# Patient Record
Sex: MALE | Race: OTHER | HISPANIC OR LATINO | Employment: OTHER | URBAN - METROPOLITAN AREA
[De-identification: names, ages, dates, MRNs, and addresses within clinical notes are randomized per-mention and may not be internally consistent; named-entity substitution may affect disease eponyms.]

---

## 2017-09-14 ENCOUNTER — HOSPITAL ENCOUNTER (EMERGENCY)
Facility: HOSPITAL | Age: 62
Discharge: HOME | End: 2017-09-14
Attending: EMERGENCY MEDICINE | Admitting: EMERGENCY MEDICINE
Payer: COMMERCIAL

## 2017-09-14 VITALS
OXYGEN SATURATION: 98 % | SYSTOLIC BLOOD PRESSURE: 166 MMHG | WEIGHT: 140 LBS | TEMPERATURE: 98.5 F | HEART RATE: 60 BPM | DIASTOLIC BLOOD PRESSURE: 98 MMHG | RESPIRATION RATE: 16 BRPM

## 2017-09-14 DIAGNOSIS — H26.9 CATARACT: Primary | ICD-10-CM

## 2017-09-14 PROCEDURE — 99283 EMERGENCY DEPT VISIT LOW MDM: CPT

## 2017-09-14 RX ORDER — PROPARACAINE HYDROCHLORIDE 5 MG/ML
2 SOLUTION/ DROPS OPHTHALMIC ONCE
Status: COMPLETED | OUTPATIENT
Start: 2017-09-14 | End: 2017-09-14

## 2017-09-14 RX ADMIN — FLUORESCEIN SODIUM 1 STRIP: 1 STRIP OPHTHALMIC at 17:22

## 2017-09-14 RX ADMIN — PROPARACAINE HYDROCHLORIDE 2 DROP: 5 SOLUTION/ DROPS OPHTHALMIC at 17:22

## 2019-06-18 ENCOUNTER — DOCTOR'S OFFICE (OUTPATIENT)
Dept: URBAN - METROPOLITAN AREA CLINIC 136 | Facility: CLINIC | Age: 64
Setting detail: OPHTHALMOLOGY
End: 2019-06-18
Payer: COMMERCIAL

## 2019-06-18 DIAGNOSIS — H25.013: ICD-10-CM

## 2019-06-18 DIAGNOSIS — H25.012: ICD-10-CM

## 2019-06-18 PROCEDURE — 76512 OPH US DX B-SCAN: CPT | Performed by: OPHTHALMOLOGY

## 2019-06-18 PROCEDURE — 92004 COMPRE OPH EXAM NEW PT 1/>: CPT | Performed by: OPHTHALMOLOGY

## 2019-06-18 ASSESSMENT — VISUAL ACUITY
OD_BCVA: 20/HM
OS_BCVA: 20/300

## 2019-06-18 ASSESSMENT — REFRACTION_AUTOREFRACTION
OD_SPHERE: -0.25
OD_CYLINDER: -1.50
OD_AXIS: 096

## 2019-06-18 ASSESSMENT — REFRACTION_MANIFEST
OS_VA2: 20/
OD_VA3: 20/
OD_VA1: 20/
OS_VA2: 20/
OD_VA1: 20/
OS_VA3: 20/
OD_VA3: 20/
OU_VA: 20/
OS_VA3: 20/
OS_VA1: 20/
OD_VA2: 20/
OS_VA1: 20/
OU_VA: 20/
OD_VA2: 20/

## 2019-06-18 ASSESSMENT — REFRACTION_CURRENTRX
OD_OVR_VA: 20/
OD_OVR_VA: 20/
OS_OVR_VA: 20/
OD_OVR_VA: 20/
OS_OVR_VA: 20/
OS_OVR_VA: 20/

## 2019-06-18 ASSESSMENT — CONFRONTATIONAL VISUAL FIELD TEST (CVF)
OD_FINDINGS: FULL
OS_FINDINGS: CONSTRICTION

## 2019-06-18 ASSESSMENT — SPHEQUIV_DERIVED: OD_SPHEQUIV: -1

## 2021-06-02 ENCOUNTER — HOSPITAL ENCOUNTER (INPATIENT)
Facility: HOSPITAL | Age: 66
LOS: 8 days | Discharge: HOME HEALTH | DRG: 840 | End: 2021-06-10
Attending: EMERGENCY MEDICINE | Admitting: HOSPITALIST
Payer: MEDICARE

## 2021-06-02 ENCOUNTER — APPOINTMENT (INPATIENT)
Dept: RADIOLOGY | Facility: HOSPITAL | Age: 66
DRG: 840 | End: 2021-06-02
Payer: MEDICARE

## 2021-06-02 ENCOUNTER — APPOINTMENT (EMERGENCY)
Dept: RADIOLOGY | Facility: HOSPITAL | Age: 66
DRG: 840 | End: 2021-06-02
Payer: MEDICARE

## 2021-06-02 DIAGNOSIS — Z85.71 HISTORY OF HODGKIN'S LYMPHOMA: ICD-10-CM

## 2021-06-02 DIAGNOSIS — I26.93 SINGLE SUBSEGMENTAL PULMONARY EMBOLISM WITHOUT ACUTE COR PULMONALE (HCC): ICD-10-CM

## 2021-06-02 DIAGNOSIS — R50.9 FEVER: ICD-10-CM

## 2021-06-02 DIAGNOSIS — I95.9 HYPOTENSION: ICD-10-CM

## 2021-06-02 DIAGNOSIS — I42.9 CARDIOMYOPATHY (HCC): ICD-10-CM

## 2021-06-02 DIAGNOSIS — A41.9 SEPSIS WITHOUT ACUTE ORGAN DYSFUNCTION, DUE TO UNSPECIFIED ORGANISM (HCC): ICD-10-CM

## 2021-06-02 DIAGNOSIS — E87.6 HYPOKALEMIA: ICD-10-CM

## 2021-06-02 DIAGNOSIS — I47.2 NONSUSTAINED VENTRICULAR TACHYCARDIA (HCC): ICD-10-CM

## 2021-06-02 DIAGNOSIS — R94.31 ABNORMAL ECG: ICD-10-CM

## 2021-06-02 DIAGNOSIS — C81.93 HODGKIN LYMPHOMA OF INTRA-ABDOMINAL LYMPH NODES, UNSPECIFIED HODGKIN LYMPHOMA TYPE (HCC): Chronic | ICD-10-CM

## 2021-06-02 DIAGNOSIS — D64.9 ANEMIA: ICD-10-CM

## 2021-06-02 DIAGNOSIS — E87.2 LACTIC ACIDOSIS: ICD-10-CM

## 2021-06-02 DIAGNOSIS — R65.10: ICD-10-CM

## 2021-06-02 DIAGNOSIS — R00.0 SINUS TACHYCARDIA: ICD-10-CM

## 2021-06-02 DIAGNOSIS — N39.0 ACUTE URINARY TRACT INFECTION: Primary | ICD-10-CM

## 2021-06-02 PROBLEM — C81.90 HODGKIN LYMPHOMA (HCC): Status: ACTIVE | Noted: 2021-06-02

## 2021-06-02 PROBLEM — E87.1 HYPONATREMIA: Status: ACTIVE | Noted: 2021-06-02

## 2021-06-02 LAB
ABO GROUP BLD: NORMAL
ABO GROUP BLD: NORMAL
ALBUMIN SERPL BCP-MCNC: 1.6 G/DL (ref 3.5–5)
ALP SERPL-CCNC: 206 U/L (ref 46–116)
ALT SERPL W P-5'-P-CCNC: 49 U/L (ref 12–78)
ANION GAP SERPL CALCULATED.3IONS-SCNC: 5 MMOL/L (ref 4–13)
APTT PPP: 36 SECONDS (ref 23–37)
AST SERPL W P-5'-P-CCNC: 40 U/L (ref 5–45)
ATRIAL RATE: 126 BPM
BACTERIA UR QL AUTO: ABNORMAL /HPF
BASOPHILS # BLD AUTO: 0 THOUSANDS/ΜL (ref 0–0.1)
BASOPHILS NFR BLD AUTO: 0 % (ref 0–1)
BILIRUB SERPL-MCNC: 0.32 MG/DL (ref 0.2–1)
BILIRUB UR QL STRIP: NEGATIVE
BLD GP AB SCN SERPL QL: NEGATIVE
BUN SERPL-MCNC: 12 MG/DL (ref 5–25)
CALCIUM ALBUM COR SERPL-MCNC: 10.4 MG/DL (ref 8.3–10.1)
CALCIUM SERPL-MCNC: 8.5 MG/DL (ref 8.3–10.1)
CAOX CRY URNS QL MICRO: ABNORMAL /HPF
CHLORIDE SERPL-SCNC: 95 MMOL/L (ref 100–108)
CLARITY UR: CLEAR
CO2 SERPL-SCNC: 35 MMOL/L (ref 21–32)
COLOR UR: ABNORMAL
CREAT SERPL-MCNC: 0.57 MG/DL (ref 0.6–1.3)
EOSINOPHIL # BLD AUTO: 0.2 THOUSAND/ΜL (ref 0–0.61)
EOSINOPHIL NFR BLD AUTO: 4 % (ref 0–6)
ERYTHROCYTE [DISTWIDTH] IN BLOOD BY AUTOMATED COUNT: 20.2 % (ref 11.6–15.1)
GFR SERPL CREATININE-BSD FRML MDRD: 107 ML/MIN/1.73SQ M
GLUCOSE SERPL-MCNC: 99 MG/DL (ref 65–140)
GLUCOSE UR STRIP-MCNC: ABNORMAL MG/DL
HCT VFR BLD AUTO: 21.4 % (ref 36.5–49.3)
HGB BLD-MCNC: 6.3 G/DL (ref 12–17)
HGB UR QL STRIP.AUTO: NEGATIVE
HYALINE CASTS #/AREA URNS LPF: ABNORMAL /LPF
IMM GRANULOCYTES # BLD AUTO: 0.06 THOUSAND/UL (ref 0–0.2)
IMM GRANULOCYTES NFR BLD AUTO: 1 % (ref 0–2)
INR PPP: 1.62 (ref 0.84–1.19)
KETONES UR STRIP-MCNC: NEGATIVE MG/DL
LACTATE SERPL-SCNC: 1.6 MMOL/L (ref 0.5–2)
LACTATE SERPL-SCNC: 3.4 MMOL/L (ref 0.5–2)
LEUKOCYTE ESTERASE UR QL STRIP: NEGATIVE
LYMPHOCYTES # BLD AUTO: 0.28 THOUSANDS/ΜL (ref 0.6–4.47)
LYMPHOCYTES NFR BLD AUTO: 6 % (ref 14–44)
MCH RBC QN AUTO: 21 PG (ref 26.8–34.3)
MCHC RBC AUTO-ENTMCNC: 29.4 G/DL (ref 31.4–37.4)
MCV RBC AUTO: 71 FL (ref 82–98)
MONOCYTES # BLD AUTO: 0.34 THOUSAND/ΜL (ref 0.17–1.22)
MONOCYTES NFR BLD AUTO: 7 % (ref 4–12)
MUCOUS THREADS UR QL AUTO: ABNORMAL
NEUTROPHILS # BLD AUTO: 4.24 THOUSANDS/ΜL (ref 1.85–7.62)
NEUTS SEG NFR BLD AUTO: 82 % (ref 43–75)
NITRITE UR QL STRIP: NEGATIVE
NON-SQ EPI CELLS URNS QL MICRO: ABNORMAL /HPF
NRBC BLD AUTO-RTO: 0 /100 WBCS
P AXIS: 80 DEGREES
PH UR STRIP.AUTO: 7.5 [PH]
PLATELET # BLD AUTO: 417 THOUSANDS/UL (ref 149–390)
PMV BLD AUTO: 9.3 FL (ref 8.9–12.7)
POTASSIUM SERPL-SCNC: 4.3 MMOL/L (ref 3.5–5.3)
PR INTERVAL: 152 MS
PROCALCITONIN SERPL-MCNC: 0.63 NG/ML
PROT SERPL-MCNC: 6.3 G/DL (ref 6.4–8.2)
PROT UR STRIP-MCNC: ABNORMAL MG/DL
PROTHROMBIN TIME: 18.9 SECONDS (ref 11.6–14.5)
QRS AXIS: 66 DEGREES
QRSD INTERVAL: 78 MS
QT INTERVAL: 328 MS
QTC INTERVAL: 449 MS
RBC # BLD AUTO: 3 MILLION/UL (ref 3.88–5.62)
RBC #/AREA URNS AUTO: ABNORMAL /HPF
RH BLD: POSITIVE
RH BLD: POSITIVE
SARS-COV-2 RNA RESP QL NAA+PROBE: NEGATIVE
SODIUM SERPL-SCNC: 135 MMOL/L (ref 136–145)
SP GR UR STRIP.AUTO: 1.02 (ref 1–1.03)
SPECIMEN EXPIRATION DATE: NORMAL
T WAVE AXIS: 73 DEGREES
UROBILINOGEN UR QL STRIP.AUTO: 1 E.U./DL
VENTRICULAR RATE: 113 BPM
WBC # BLD AUTO: 5.12 THOUSAND/UL (ref 4.31–10.16)
WBC #/AREA URNS AUTO: ABNORMAL /HPF

## 2021-06-02 PROCEDURE — 86920 COMPATIBILITY TEST SPIN: CPT

## 2021-06-02 PROCEDURE — 84145 PROCALCITONIN (PCT): CPT | Performed by: EMERGENCY MEDICINE

## 2021-06-02 PROCEDURE — 86900 BLOOD TYPING SEROLOGIC ABO: CPT | Performed by: EMERGENCY MEDICINE

## 2021-06-02 PROCEDURE — 85025 COMPLETE CBC W/AUTO DIFF WBC: CPT | Performed by: EMERGENCY MEDICINE

## 2021-06-02 PROCEDURE — 96361 HYDRATE IV INFUSION ADD-ON: CPT

## 2021-06-02 PROCEDURE — 86901 BLOOD TYPING SEROLOGIC RH(D): CPT | Performed by: EMERGENCY MEDICINE

## 2021-06-02 PROCEDURE — 85730 THROMBOPLASTIN TIME PARTIAL: CPT | Performed by: EMERGENCY MEDICINE

## 2021-06-02 PROCEDURE — U0005 INFEC AGEN DETEC AMPLI PROBE: HCPCS | Performed by: EMERGENCY MEDICINE

## 2021-06-02 PROCEDURE — 85610 PROTHROMBIN TIME: CPT | Performed by: EMERGENCY MEDICINE

## 2021-06-02 PROCEDURE — 99223 1ST HOSP IP/OBS HIGH 75: CPT | Performed by: INTERNAL MEDICINE

## 2021-06-02 PROCEDURE — 30233N1 TRANSFUSION OF NONAUTOLOGOUS RED BLOOD CELLS INTO PERIPHERAL VEIN, PERCUTANEOUS APPROACH: ICD-10-PCS | Performed by: EMERGENCY MEDICINE

## 2021-06-02 PROCEDURE — 36430 TRANSFUSION BLD/BLD COMPNT: CPT

## 2021-06-02 PROCEDURE — 83605 ASSAY OF LACTIC ACID: CPT | Performed by: EMERGENCY MEDICINE

## 2021-06-02 PROCEDURE — 80053 COMPREHEN METABOLIC PANEL: CPT | Performed by: EMERGENCY MEDICINE

## 2021-06-02 PROCEDURE — 87040 BLOOD CULTURE FOR BACTERIA: CPT | Performed by: EMERGENCY MEDICINE

## 2021-06-02 PROCEDURE — 86850 RBC ANTIBODY SCREEN: CPT | Performed by: EMERGENCY MEDICINE

## 2021-06-02 PROCEDURE — 1124F ACP DISCUSS-NO DSCNMKR DOCD: CPT | Performed by: EMERGENCY MEDICINE

## 2021-06-02 PROCEDURE — 71045 X-RAY EXAM CHEST 1 VIEW: CPT

## 2021-06-02 PROCEDURE — 93010 ELECTROCARDIOGRAM REPORT: CPT | Performed by: INTERNAL MEDICINE

## 2021-06-02 PROCEDURE — 96360 HYDRATION IV INFUSION INIT: CPT

## 2021-06-02 PROCEDURE — 36415 COLL VENOUS BLD VENIPUNCTURE: CPT

## 2021-06-02 PROCEDURE — 96365 THER/PROPH/DIAG IV INF INIT: CPT

## 2021-06-02 PROCEDURE — 81001 URINALYSIS AUTO W/SCOPE: CPT | Performed by: EMERGENCY MEDICINE

## 2021-06-02 PROCEDURE — 99285 EMERGENCY DEPT VISIT HI MDM: CPT

## 2021-06-02 PROCEDURE — U0003 INFECTIOUS AGENT DETECTION BY NUCLEIC ACID (DNA OR RNA); SEVERE ACUTE RESPIRATORY SYNDROME CORONAVIRUS 2 (SARS-COV-2) (CORONAVIRUS DISEASE [COVID-19]), AMPLIFIED PROBE TECHNIQUE, MAKING USE OF HIGH THROUGHPUT TECHNOLOGIES AS DESCRIBED BY CMS-2020-01-R: HCPCS | Performed by: EMERGENCY MEDICINE

## 2021-06-02 PROCEDURE — P9016 RBC LEUKOCYTES REDUCED: HCPCS

## 2021-06-02 PROCEDURE — 99291 CRITICAL CARE FIRST HOUR: CPT | Performed by: EMERGENCY MEDICINE

## 2021-06-02 PROCEDURE — 93005 ELECTROCARDIOGRAM TRACING: CPT

## 2021-06-02 RX ORDER — LORATADINE 10 MG/1
10 TABLET ORAL DAILY
COMMUNITY

## 2021-06-02 RX ORDER — IBUPROFEN 200 MG
400 TABLET ORAL EVERY 6 HOURS PRN
COMMUNITY
End: 2021-06-10 | Stop reason: HOSPADM

## 2021-06-02 RX ORDER — ACETAMINOPHEN 325 MG/1
650 TABLET ORAL EVERY 6 HOURS PRN
Status: DISCONTINUED | OUTPATIENT
Start: 2021-06-02 | End: 2021-06-10 | Stop reason: HOSPADM

## 2021-06-02 RX ORDER — ACETAMINOPHEN 325 MG/1
975 TABLET ORAL ONCE
Status: COMPLETED | OUTPATIENT
Start: 2021-06-02 | End: 2021-06-02

## 2021-06-02 RX ORDER — PANTOPRAZOLE SODIUM 40 MG/1
40 TABLET, DELAYED RELEASE ORAL DAILY
COMMUNITY

## 2021-06-02 RX ORDER — DILTIAZEM HYDROCHLORIDE 5 MG/ML
10 INJECTION INTRAVENOUS ONCE
Status: COMPLETED | OUTPATIENT
Start: 2021-06-02 | End: 2021-06-02

## 2021-06-02 RX ORDER — FERROUS SULFATE 325(65) MG
325 TABLET ORAL
COMMUNITY

## 2021-06-02 RX ORDER — HEPARIN SODIUM 5000 [USP'U]/ML
5000 INJECTION, SOLUTION INTRAVENOUS; SUBCUTANEOUS EVERY 8 HOURS SCHEDULED
Status: DISCONTINUED | OUTPATIENT
Start: 2021-06-02 | End: 2021-06-04

## 2021-06-02 RX ADMIN — CEFEPIME HYDROCHLORIDE 2000 MG: 2 INJECTION, POWDER, FOR SOLUTION INTRAVENOUS at 16:28

## 2021-06-02 RX ADMIN — ACETAMINOPHEN 650 MG: 325 TABLET, FILM COATED ORAL at 22:17

## 2021-06-02 RX ADMIN — HEPARIN SODIUM 5000 UNITS: 5000 INJECTION INTRAVENOUS; SUBCUTANEOUS at 22:18

## 2021-06-02 RX ADMIN — SODIUM CHLORIDE 1000 ML: 0.9 INJECTION, SOLUTION INTRAVENOUS at 15:32

## 2021-06-02 RX ADMIN — ACETAMINOPHEN 975 MG: 325 TABLET, FILM COATED ORAL at 16:24

## 2021-06-02 RX ADMIN — DILTIAZEM HYDROCHLORIDE 10 MG: 5 INJECTION INTRAVENOUS at 22:11

## 2021-06-02 NOTE — ED PROVIDER NOTES
History  Chief Complaint   Patient presents with    Fever - 9 weeks to 74 years     pt with hx of cancer continues with fevers and chills  was recently admitted at [address] [facility] for the same  fevers continue  states recently tested for covid which was negative  pt denies any complaints or pain at this time  77 y o  M w/ h/o Hodgkin lymphoma p/w fever x few days  Pt just admitted to [facility] on 5/25-5/30/21 (pt receives all care at [address]) for fever/hypoNa/hypoK  Working dx that fever was related to his cancer  Pt reports he was okay after discharge, but then fever returned  Associated with frequency and "a little" cough  Pt denies HA, CP, SOB, abd pain, myalgias  History provided by:  Relative and patient   used: Yes ([employer])    Fever - 9 weeks to 74 years  Max temp prior to arrival:  Unsure  Chronicity:  Recurrent  Relieved by:  None tried  Worsened by:  Nothing  Ineffective treatments:  None tried  Associated symptoms: cough    Associated symptoms: no chest pain, no nausea and no vomiting    Risk factors: hx of cancer and recent sickness        Prior to Admission Medications   Prescriptions Last Dose Informant Patient Reported? Taking?   ferrous sulfate 325 (65 Fe) mg tablet   Yes Yes   Sig: Take 325 mg by mouth daily with breakfast   ibuprofen (MOTRIN) 200 mg tablet   Yes Yes   Sig: Take 400 mg by mouth every 6 (six) hours as needed for mild pain   loratadine (CLARITIN) 10 mg tablet   Yes Yes   Sig: Take 10 mg by mouth daily   pantoprazole (PROTONIX) 40 mg tablet   Yes Yes   Sig: Take 40 mg by mouth daily      Facility-Administered Medications: None       Past Medical History:   Diagnosis Date    Lymphoma ([facility] [place] [address] )        History reviewed  No pertinent surgical history  History reviewed  No pertinent family history  I have reviewed and agree with the history as documented      E-Cigarette/Vaping     E-Cigarette/Vaping Substances     Social History     Tobacco Use    Smoking status: Never Smoker    Smokeless tobacco: Never Used   Substance Use Topics    Alcohol use: No    Drug use: No       Review of Systems   Constitutional: Positive for fever  Respiratory: Positive for cough  Negative for shortness of breath  Cardiovascular: Negative for chest pain  Gastrointestinal: Negative for abdominal pain, nausea and vomiting  Genitourinary: Positive for frequency  All other systems reviewed and are negative  Physical Exam  Physical Exam  Vitals signs and nursing note reviewed  Constitutional:       General: He is not in acute distress  Appearance: He is well-developed  He is not ill-appearing, toxic-appearing or diaphoretic  HENT:      Head: Normocephalic and atraumatic  Eyes:      General: No scleral icterus  Conjunctiva/sclera:      Right eye: Right conjunctiva is not injected  Left eye: Left conjunctiva is not injected  Neck:      Musculoskeletal: Normal range of motion  Vascular: No JVD  Trachea: Trachea normal    Cardiovascular:      Rate and Rhythm: Regular rhythm  Tachycardia present  Pulses: Normal pulses  Heart sounds: Normal heart sounds  No murmur  No friction rub  Pulmonary:      Effort: Pulmonary effort is normal  No accessory muscle usage or respiratory distress  Breath sounds: No stridor  Rhonchi present  No wheezing or rales  Chest:      Chest wall: No tenderness  Abdominal:      General: There is no distension  Palpations: Abdomen is soft  Tenderness: There is no abdominal tenderness  There is no guarding or rebound  Skin:     General: Skin is warm and dry  Coloration: Skin is not pale  Findings: No rash  Neurological:      Mental Status: He is alert  GCS: GCS eye subscore is 4  GCS verbal subscore is 5  GCS motor subscore is 6     Psychiatric:         Behavior: Behavior normal          Vital Signs  ED Triage Vitals   Temperature Pulse Respirations Blood Pressure SpO2   06/02/21 1525 06/02/21 1525 06/02/21 1525 06/02/21 1525 06/02/21 1525   (!) 103 1 °F (39 5 °C) (!) 126 18 92/51 96 %      Temp Source Heart Rate Source Patient Position - Orthostatic VS BP Location FiO2 (%)   06/02/21 1525 06/02/21 1751 06/02/21 1751 06/02/21 1751 --   Oral Monitor Lying Left arm       Pain Score       06/02/21 1624       Med Not Given for Pain - for MAR use only           Vitals:    06/02/21 1921 06/02/21 1935 06/02/21 1945 06/1955   BP: 96/62 101/65 103/69 106/69   Pulse: 93 92 94 89   Patient Position - Orthostatic VS: Lying            Visual Acuity      ED Medications  Medications   heparin (porcine) subcutaneous injection 5,000 Units (has no administration in time range)   acetaminophen (TYLENOL) tablet 650 mg (has no administration in time range)   cefepime (MAXIPIME) 2,000 mg in dextrose 5 % 50 mL IVPB (has no administration in time range)   sodium chloride 0 9 % bolus 1,000 mL (0 mL Intravenous Stopped 6/2/21 1722)   cefepime (MAXIPIME) 2 g/50 mL dextrose IVPB (0 mg Intravenous Stopped 6/2/21 1751)   acetaminophen (TYLENOL) tablet 975 mg (975 mg Oral Given 6/2/21 1624)       Diagnostic Studies  Results Reviewed     Procedure Component Value Units Date/Time    Lactic acid 2 Hours [[id number]]  (Normal) Collected: 06/02/21 1724    Lab Status: Final result Specimen: Blood from Arm, Right Updated: 06/02/21 1800     LACTIC ACID 1 6 mmol/L     Narrative:      Result may be elevated if tourniquet was used during collection  Novel Coronavirus (Covid-19),PCR [facility] - 2 Hour Stat [[id number]]  (Normal) Collected: 06/02/21 1626    Lab Status: Final result Specimen: Nares from Nasopharyngeal Swab Updated: 06/02/21 1730     SARS-CoV-2 Negative    Narrative:       The specimen collection materials, transport medium, and/or testing methodology utilized in the production of these test results have been proven to be reliable in a limited validation with an abbreviated program under the Emergency Utilization Authorization provided by the FDA  Testing reported as "Presumptive positive" will be confirmed with secondary testing to ensure result accuracy  Clinical caution and judgement should be used with the interpretation of these results with consideration of the clinical impression and other laboratory testing  Testing reported as "Positive" or "Negative" has been proven to be accurate according to standard laboratory validation requirements  All testing is performed with control materials showing appropriate reactivity at standard intervals  Urine Microscopic [[id number]]  (Abnormal) Collected: 06/02/21 1529    Lab Status: Final result Specimen: Urine, Clean Catch Updated: 06/02/21 1611     RBC, UA None Seen /hpf      WBC, UA 2-4 /hpf      Epithelial Cells Occasional /hpf      Bacteria, UA Moderate /hpf      Hyaline Casts, UA 1-2 /lpf      Ca Oxalate Kayla, UA Moderate /hpf      MUCUS THREADS Innumerable    Lactic acid [[id number]]  (Abnormal) Collected: 06/02/21 1527    Lab Status: Final result Specimen: Blood from Arm, Right Updated: 06/02/21 1603     LACTIC ACID 3 4 mmol/L     Narrative:      Result may be elevated if tourniquet was used during collection      Comprehensive metabolic panel [[id number]]  (Abnormal) Collected: 06/02/21 1527    Lab Status: Final result Specimen: Blood from Arm, Right Updated: 06/02/21 1559     Sodium 135 mmol/L      Potassium 4 3 mmol/L      Chloride 95 mmol/L      CO2 35 mmol/L      ANION GAP 5 mmol/L      BUN 12 mg/dL      Creatinine 0 57 mg/dL      Glucose 99 mg/dL      Calcium 8 5 mg/dL      Corrected Calcium 10 4 mg/dL      AST 40 U/L      ALT 49 U/L      Alkaline Phosphatase 206 U/L      Total Protein 6 3 g/dL      Albumin 1 6 g/dL      Total Bilirubin 0 32 mg/dL      eGFR 107 ml/min/1 73sq m     Narrative:      [place] guidelines for Chronic Kidney Disease (CKD):     Stage 1 with normal or high GFR (GFR > 90 mL/min/1 73 square meters)    Stage 2 Mild CKD (GFR = 60-89 mL/min/1 73 square meters)    Stage 3A Moderate CKD (GFR = 45-59 mL/min/1 73 square meters)    Stage 3B Moderate CKD (GFR = 30-44 mL/min/1 73 square meters)    Stage 4 Severe CKD (GFR = 15-29 mL/min/1 73 square meters)    Stage 5 End Stage CKD (GFR <15 mL/min/1 73 square meters)  Note: GFR calculation is accurate only with a steady state creatinine    UA w Reflex to Microscopic w Reflex to Culture [[id number]]  (Abnormal) Collected: 06/02/21 1529    Lab Status: Final result Specimen: Urine, Clean Catch Updated: 06/02/21 1556     Color, UA Cheryl     Clarity, UA Clear     Specific [place], UA 1 020     pH, UA 7 5     Leukocytes, UA Negative     Nitrite, UA Negative     Protein,  (2+) mg/dl      Glucose,  (1/4%) mg/dl      Ketones, UA Negative mg/dl      Urobilinogen, UA 1 0 E U /dl      Bilirubin, UA Negative     Blood, UA Negative    APTT [[id number]]  (Normal) Collected: 06/02/21 1527    Lab Status: Final result Specimen: Blood from Arm, Right Updated: 06/02/21 1554     PTT 36 seconds     Protime-INR [[id number]]  (Abnormal) Collected: 06/02/21 1527    Lab Status: Final result Specimen: Blood from Arm, Right Updated: 06/02/21 1554     Protime 18 9 seconds      INR 1 62    CBC and differential [[id number]]  (Abnormal) Collected: 06/02/21 1527    Lab Status: Final result Specimen: Blood from Arm, Right Updated: 06/02/21 1546     WBC 5 12 Thousand/uL      RBC 3 00 Million/uL      Hemoglobin 6 3 g/dL      Hematocrit 21 4 %      MCV 71 fL      MCH 21 0 pg      MCHC 29 4 g/dL      RDW 20 2 %      MPV 9 3 fL      Platelets 336 Thousands/uL      nRBC 0 /100 WBCs      Neutrophils Relative 82 %      Immat GRANS % 1 %      Lymphocytes Relative 6 %      Monocytes Relative 7 %      Eosinophils Relative 4 %      Basophils Relative 0 %      Neutrophils Absolute 4 24 Thousands/µL      Immature Grans Absolute 0 06 Thousand/uL      Lymphocytes Absolute 0 28 Thousands/µL      Monocytes Absolute 0 34 Thousand/µL      Eosinophils Absolute 0 20 Thousand/µL      Basophils Absolute 0 00 Thousands/µL     Procalcitonin with AM Reflex [[id number]] Collected: 06/02/21 1527    Lab Status: In process Specimen: Blood from Arm, Right Updated: 06/02/21 1535    Blood culture #2 [[id number]] Collected: 06/02/21 1527    Lab Status: In process Specimen: Blood from Arm, Left Updated: 06/02/21 1534    Blood culture #1 [[id number]] Collected: 06/02/21 1527    Lab Status:  In process Specimen: Blood from Arm, Right Updated: 06/02/21 1534                 XR chest 1 view portable   Final Result by Alpheus Dance, MD (06/02 1919)      No evidence of acute abnormality in the chest                   Workstation performed: [id number]                    Procedures  ECG 12 Lead Documentation Only    Date/Time: 6/2/2021 4:00 PM  Performed by: Hector Calderon DO  Authorized by: Hector Calderon DO     Indications / Diagnosis:  Fever  ECG reviewed by me, the ED Provider: yes    Patient location:  Bedside  Previous ECG:     Previous ECG:  Unavailable  Rate:     ECG rate:  113    ECG rate assessment: tachycardic    Rhythm:     Rhythm: sinus tachycardia    Ectopy:     Ectopy: none    QRS:     QRS axis:  Normal    QRS intervals:  Normal  ST segments:     ST segments:  Normal  T waves:     T waves: normal      CriticalCare Time  Performed by: Hector Calderon DO  Authorized by: Hector Calderon DO     Critical care provider statement:     Critical care time (minutes):  60    Critical care time was exclusive of:  Separately billable procedures and treating other patients and teaching time    Critical care was necessary to treat or prevent imminent or life-threatening deterioration of the following conditions:  Sepsis and circulatory failure    Critical care was time spent personally by me on the following activities:  Blood draw for specimens, obtaining history from patient or surrogate, development of treatment plan with patient or surrogate, discussions with consultants, evaluation of patient's response to treatment, examination of patient, ordering and performing treatments and interventions, ordering and review of laboratory studies, ordering and review of radiographic studies, re-evaluation of patient's condition and review of old charts    I assumed direction of critical care for this patient from another provider in my specialty: no               ED Course  ED Course as of Jun 02 2102   Wed Jun 02, 2021   1556 7 2 on 5/28/21  Pt consented to blood  Hemoglobin(!!): 6 3   1558 Temperature(!): 103 1 °F (39 5 °C)   1558 Pulse(!): 126   1602 Blood Pressure: 92/51   1603 INR(!): 1 62   1604 Cefepime ordered  Sepsis alert called  LACTIC ACID(!!): 3 4   1612 Bacteria, UA(!): Moderate   1833 Improved   LACTIC ACID: 1 6                         Initial Sepsis Screening     Row Name 06/02/21 1602                Is the patient's history suggestive of a new or worsening infection?  --        Suspected source of infection  pneumonia;urinary tract infection  -CR        Are two or more of the following signs & symptoms of infection both present and new to the patient?  --        Indicate SIRS criteria  Hyperthemia > 38 3C (100 9F); Tachycardia > 90 bpm  -CR        If the answer is yes to both questions, suspicion of sepsis is present  --        If severe sepsis is present AND tissue hypoperfusion perists in the hour after fluid resuscitation or lactate > 4, the patient meets criteria for SEPTIC SHOCK  --        Are any of the following organ dysfunction criteria present within 6 hours of suspected infection and SIRS criteria that are NOT considered to be chronic conditions? (!) Yes  -CR        Organ dysfunction  INR > 1 5 or aPTT > 60 secs; Lactate > 2 0 mmol/L  -CR        Date of presentation of severe sepsis  06/02/21  -CR        Time of presentation of severe sepsis  1603  -CR        Tissue hypoperfusion persists in the hour after crystalloid fluid administration, evidenced, by either:  --        Was hypotension present within one hour of the conclusion of crystalloid fluid administration? No  -CR        Date of presentation of septic shock  --        Time of presentation of septic shock  --          User Key  (r) = Recorded By, (t) = Taken By, (c) = Cosigned By    [address] Name Provider Type    TABATHA Maria DO Physician          SBIRT 22yo+      Most Recent Value   SBIRT (23 yo +)   In order to provide better care to our patients, we are screening all of our patients for alcohol and drug use  Would it be okay to ask you these screening questions? Yes Filed at: 06/02/2021 1536   Initial Alcohol Screen: US AUDIT-C    1  How often do you have a drink containing alcohol?  0 Filed at: 06/02/2021 1536   2  How many drinks containing alcohol do you have on a typical day you are drinking? 0 Filed at: 06/02/2021 1536   3b  FEMALE Any Age, or MALE 65+: How often do you have 4 or more drinks on one occassion? 0 Filed at: 06/02/2021 1536   Audit-C Score  0 Filed at: 06/02/2021 1536   ASHISH: How many times in the past year have you    Used an illegal drug or used a prescription medication for non-medical reasons?   Never Filed at: 06/02/2021 1536                    MDM    Disposition  Final diagnoses:   Acute urinary tract infection   Fever   Anemia   History of Hodgkin's lymphoma   Lactic acidosis     Time reflects when diagnosis was documented in both MDM as applicable and the Disposition within this note     Time User Action Codes Description Comment    6/2/2021  4:20 PM Belkis Maria Add [N39 0] Acute urinary tract infection     6/2/2021  4:20 PM Belkis Maria Add [R50 9] Fever     6/2/2021  4:20 PM Jadine Boom [D64 9] Anemia     6/2/2021  4:21 PM Gudelia Cretre Add [Z85 71] History of Hodgkin's lymphoma     6/2/2021  4:21 PM Radha Maria 48 [E87 2] Lactic acidosis       ED Disposition     ED Disposition Condition Date/Time Comment Admit Stable Wed Jun 2, 2021  6:11 PM Case was discussed with Dr Jeromy Zambrano and the patient's admission status was agreed to be Admission Status: inpatient status to the service of Dr Jeromy Zambrano  Follow-up Information    None         Current Discharge Medication List      CONTINUE these medications which have NOT CHANGED    Details   ferrous sulfate 325 (65 Fe) mg tablet Take 325 mg by mouth daily with breakfast      ibuprofen (MOTRIN) 200 mg tablet Take 400 mg by mouth every 6 (six) hours as needed for mild pain      loratadine (CLARITIN) 10 mg tablet Take 10 mg by mouth daily      pantoprazole (PROTONIX) 40 mg tablet Take 40 mg by mouth daily           No discharge procedures on file      PDMP Review     None          ED Provider  Electronically Signed by           DO Kiley  06/02/21 2405

## 2021-06-02 NOTE — ASSESSMENT & PLAN NOTE
· Likely secondary to lymphoma    · He has no jaundice/elevated LFTs to suggest hemolysis  · He has no sign of bleeding  · He will be transfused in the ED  · Continue to monitor and transfuse as needed

## 2021-06-02 NOTE — ASSESSMENT & PLAN NOTE
· Will need to coordinate with his hematologist/oncologist at [facility] regarding continuation of care  · According to his wife and [facility] records he will need immunotherapy , however this has not been started due to insurance issues    · He is high risk for infection due to his lymphoma and immunocompromised state

## 2021-06-02 NOTE — H&P
[facility]  H&P- Deangelo Cortes 1955, 77 y o  male MRN: [id number]  Unit/Bed#: ED 26 Encounter: [id number]  Primary Care Provider: Jennifer Rodriguez MD   Date and time admitted to hospital: 6/2/2021  3:12 PM    * Sepsis [facility])  Assessment & Plan  · Sepsis due to possible UTI/pneumonia versus SIRS from lymphoma  · Noted to have elevated lactic acid together with fever and tachycardia on admission  · Follow-up blood and urine culture results  · Start empiric cefepime    Hyponatremia  Assessment & Plan  · Mild likely due to underlying malignancy  · Monitor    Anemia  Assessment & Plan  · Likely secondary to lymphoma  · He has no jaundice/elevated LFTs to suggest hemolysis  · He has no sign of bleeding  · He will be transfused in the ED  · Continue to monitor and transfuse as needed    Hodgkin lymphoma [facility])  Assessment & Plan  · Will need to coordinate with his hematologist/oncologist at [facility] regarding continuation of care  · According to his wife and [facility] records he will need immunotherapy , however this has not been started due to insurance issues  · He is high risk for infection due to his lymphoma and immunocompromised state      VTE Prophylaxis: Heparin    Code Status:  Full code  POLST: There is no POLST form on file for this patient (pre-hospital)  Discussion with family:  Wife    Anticipated Length of Stay:  Patient will be admitted on an Inpatient basis with an anticipated length of stay of  at least 2 midnights  Justification for Hospital Stay:  Sepsis    Total Time for Visit, including Counseling / Coordination of Care: 45 minutes  Greater than 50% of this total time spent on direct patient counseling and coordination of care  Chief Complaint:   Fever chills    History of Present Illness:    Deangelo Cortes is a 77 y o  male who presents with recurrent fever and chills  Patient was interviewed via  [id number]   Information was provided by the patient, wife, and review of care everywhere record  In brief he is a 27-year-old male with known history of Hodgkin's lymphoma as far back as August 2018  He has been followed by his hematologist and has had several courses of treatment in 2018, 2019 and 2020  He apparently left in August 2020 to live in the [facility] and came back to [facility] in February 2021  In April, he was found to have progression of the disease with new biopsy-proven metastatic disease to the liver  He was admitted at [facility] last week for fever and chills  He was placed on empiric antibiotics and was seen by ID  Infection was ruled out and his fever and chills were felt to be due to tumor fever  He was sent home in stable condition  He was doing well until today when he had recurrence of the fever and chills  His wife was called from work and he was brought to the ER  On admission he was noted to be febrile and tachycardic meeting sepsis criteria  Patient denied cough, shortness of breath, phlegm, diarrhea, dysuria      Review of Systems:    Review of Systems   Constitutional: Positive for chills and fever  HENT: Negative  Eyes: Negative  Respiratory: Negative  Cardiovascular: Negative  Gastrointestinal: Negative  Genitourinary: Negative  Musculoskeletal: Negative  Neurological: Negative  Psychiatric/Behavioral: Negative  All other systems reviewed and are negative  Past Medical and Surgical History:     Past Medical History:   Diagnosis Date    Lymphoma [facility])        History reviewed  No pertinent surgical history  Meds/Allergies:    Prior to Admission medications    Medication Sig Start Date End Date Taking?  Authorizing Provider   ferrous sulfate 325 (65 Fe) mg tablet Take 325 mg by mouth daily with breakfast   Yes Historical Provider, MD   ibuprofen (MOTRIN) 200 mg tablet Take 400 mg by mouth every 6 (six) hours as needed for mild pain   Yes Historical Provider, MD   loratadine (CLARITIN) 10 mg tablet Take 10 mg by mouth daily   Yes Historical Provider, MD   pantoprazole (PROTONIX) 40 mg tablet Take 40 mg by mouth daily   Yes Historical Provider, MD     Reviewed via epic    Allergies: No Known Allergies    Social History:     Marital Status: /Civil Union   Occupation:  None  Previously worked as a   Patient Pre-hospital Living Situation:  Lives with wife  Patient Pre-hospital Level of Mobility:  Independent  Patient Pre-hospital Diet Restrictions:  None  Substance Use History:   Social History     Substance and Sexual Activity   Alcohol Use No     Social History     Tobacco Use   Smoking Status Never Smoker   Smokeless Tobacco Never Used     Social History     Substance and Sexual Activity   Drug Use No       Family History:    Father  at a very old age 80  Mother  from a fall    Physical Exam:     Vitals:   Blood Pressure: 103/69 (21)  Pulse: 94 (21)  Temperature: 97 6 °F (36 4 °C) (21)  Temp Source: Oral (21)  Respirations: 16 (21)  SpO2: 98 % (21)    Physical Exam  Vitals signs reviewed  Constitutional:       Appearance: He is ill-appearing  Comments: Cachectic   HENT:      Head: Normocephalic and atraumatic  Eyes:      General: No scleral icterus  Neck:      Musculoskeletal: Neck supple  Cardiovascular:      Rate and Rhythm: Regular rhythm  Tachycardia present  Pulmonary:      Effort: No respiratory distress  Breath sounds: No wheezing  Comments: Coarse breath sounds bilaterally  Prominent ribs and clavicles  Abdominal:      General: Abdomen is flat  Palpations: Abdomen is soft  Musculoskeletal:      Right lower leg: No edema  Left lower leg: No edema  Skin:     General: Skin is warm and dry  Neurological:      Mental Status: Mental status is at baseline     Psychiatric:         Mood and Affect: Mood normal          Behavior: Behavior normal      Additional Data:     Lab Results: I have personally reviewed pertinent reports  Results from last 7 days   Lab Units 06/02/21  1527   WBC Thousand/uL 5 12   HEMOGLOBIN g/dL 6 3*   HEMATOCRIT % 21 4*   PLATELETS Thousands/uL 417*   NEUTROS PCT % 82*   LYMPHS PCT % 6*   MONOS PCT % 7   EOS PCT % 4     Results from last 7 days   Lab Units 06/02/21  1527   SODIUM mmol/L 135*   POTASSIUM mmol/L 4 3   CHLORIDE mmol/L 95*   CO2 mmol/L 35*   BUN mg/dL 12   CREATININE mg/dL 0 57*   ANION GAP mmol/L 5   CALCIUM mg/dL 8 5   ALBUMIN g/dL 1 6*   TOTAL BILIRUBIN mg/dL 0 32   ALK PHOS U/L 206*   ALT U/L 49   AST U/L 40   GLUCOSE RANDOM mg/dL 99     Results from last 7 days   Lab Units 06/02/21  1527   INR  1 62*             Results from last 7 days   Lab Units 06/02/21  1724 06/02/21  1527   LACTIC ACID mmol/L 1 6 3 4*       Imaging: I have personally reviewed pertinent reports  XR chest 1 view portable   Final Result by Mica Orozco MD (06/02 1919)      No evidence of acute abnormality in the chest                   Workstation performed: [id number]             EKG, Pathology, and Other Studies Reviewed on Admission:   · EKG:  Sinus tachycardia    Allscripts / Epic Records Reviewed: Yes     ** Please Note: This note has been constructed using a voice recognition system   **

## 2021-06-02 NOTE — ASSESSMENT & PLAN NOTE
· Sepsis due to possible pneumonia versus SIRS from lymphoma  · Noted to have elevated lactic acid together with fever and tachycardia on admission  · Follow-up blood and urine culture results  · Start empiric cefepime

## 2021-06-02 NOTE — SEPSIS NOTE
Sepsis Note   Chaitanya Abdul 77 y o  male MRN: [id number]  Unit/Bed#: ED 26 Encounter: [id number]      qSOFA     [address] Name 06/02/21 1525                Altered mental status GCS < 15  --        Respiratory Rate > / =88  0        Systolic BP < / =747  1        Q Sofa Score  1            Initial Sepsis Screening     Row Name 06/02/21 1602                Is the patient's history suggestive of a new or worsening infection?  --        Suspected source of infection  pneumonia;urinary tract infection  -CR        Are two or more of the following signs & symptoms of infection both present and new to the patient?  --        Indicate SIRS criteria  Hyperthemia > 38 3C (100 9F); Tachycardia > 90 bpm  -CR        If the answer is yes to both questions, suspicion of sepsis is present  --        If severe sepsis is present AND tissue hypoperfusion perists in the hour after fluid resuscitation or lactate > 4, the patient meets criteria for SEPTIC SHOCK  --        Are any of the following organ dysfunction criteria present within 6 hours of suspected infection and SIRS criteria that are NOT considered to be chronic conditions? (!) Yes  -CR        Organ dysfunction  INR > 1 5 or aPTT > 60 secs; Lactate > 2 0 mmol/L  -CR        Date of presentation of severe sepsis  06/02/21  -CR        Time of presentation of severe sepsis  1603  -CR        Tissue hypoperfusion persists in the hour after crystalloid fluid administration, evidenced, by either:  --        Was hypotension present within one hour of the conclusion of crystalloid fluid administration?   No  -CR        Date of presentation of septic shock  --        Time of presentation of septic shock  --          User Key  (r) = Recorded By, (t) = Taken By, (c) = Cosigned By    [address] Name Provider Type    CR [place], DO Physician

## 2021-06-03 LAB
ABO GROUP BLD BPU: NORMAL
ALBUMIN SERPL BCP-MCNC: 1.5 G/DL (ref 3.5–5)
ALP SERPL-CCNC: 186 U/L (ref 46–116)
ALT SERPL W P-5'-P-CCNC: 41 U/L (ref 12–78)
ANION GAP SERPL CALCULATED.3IONS-SCNC: 9 MMOL/L (ref 4–13)
AST SERPL W P-5'-P-CCNC: 32 U/L (ref 5–45)
BASOPHILS # BLD AUTO: 0.01 THOUSANDS/ΜL (ref 0–0.1)
BASOPHILS NFR BLD AUTO: 0 % (ref 0–1)
BILIRUB SERPL-MCNC: 0.55 MG/DL (ref 0.2–1)
BPU ID: NORMAL
BUN SERPL-MCNC: 10 MG/DL (ref 5–25)
CALCIUM ALBUM COR SERPL-MCNC: 10.5 MG/DL (ref 8.3–10.1)
CALCIUM SERPL-MCNC: 8.5 MG/DL (ref 8.3–10.1)
CHLORIDE SERPL-SCNC: 97 MMOL/L (ref 100–108)
CO2 SERPL-SCNC: 30 MMOL/L (ref 21–32)
CREAT SERPL-MCNC: 0.54 MG/DL (ref 0.6–1.3)
CROSSMATCH: NORMAL
EOSINOPHIL # BLD AUTO: 0.12 THOUSAND/ΜL (ref 0–0.61)
EOSINOPHIL NFR BLD AUTO: 2 % (ref 0–6)
ERYTHROCYTE [DISTWIDTH] IN BLOOD BY AUTOMATED COUNT: 21.4 % (ref 11.6–15.1)
GFR SERPL CREATININE-BSD FRML MDRD: 109 ML/MIN/1.73SQ M
GLUCOSE SERPL-MCNC: 97 MG/DL (ref 65–140)
HCT VFR BLD AUTO: 23.8 % (ref 36.5–49.3)
HGB BLD-MCNC: 7.2 G/DL (ref 12–17)
IMM GRANULOCYTES # BLD AUTO: 0.08 THOUSAND/UL (ref 0–0.2)
IMM GRANULOCYTES NFR BLD AUTO: 1 % (ref 0–2)
LYMPHOCYTES # BLD AUTO: 0.34 THOUSANDS/ΜL (ref 0.6–4.47)
LYMPHOCYTES NFR BLD AUTO: 5 % (ref 14–44)
MCH RBC QN AUTO: 21.6 PG (ref 26.8–34.3)
MCHC RBC AUTO-ENTMCNC: 30.3 G/DL (ref 31.4–37.4)
MCV RBC AUTO: 71 FL (ref 82–98)
MONOCYTES # BLD AUTO: 0.42 THOUSAND/ΜL (ref 0.17–1.22)
MONOCYTES NFR BLD AUTO: 7 % (ref 4–12)
NEUTROPHILS # BLD AUTO: 5.45 THOUSANDS/ΜL (ref 1.85–7.62)
NEUTS SEG NFR BLD AUTO: 85 % (ref 43–75)
NRBC BLD AUTO-RTO: 0 /100 WBCS
PLATELET # BLD AUTO: 414 THOUSANDS/UL (ref 149–390)
PMV BLD AUTO: 9 FL (ref 8.9–12.7)
POTASSIUM SERPL-SCNC: 4.1 MMOL/L (ref 3.5–5.3)
PROCALCITONIN SERPL-MCNC: 8.38 NG/ML
PROT SERPL-MCNC: 6.1 G/DL (ref 6.4–8.2)
RBC # BLD AUTO: 3.34 MILLION/UL (ref 3.88–5.62)
SODIUM SERPL-SCNC: 136 MMOL/L (ref 136–145)
UNIT DISPENSE STATUS: NORMAL
UNIT PRODUCT CODE: NORMAL
UNIT RH: NORMAL
WBC # BLD AUTO: 6.42 THOUSAND/UL (ref 4.31–10.16)

## 2021-06-03 PROCEDURE — 84145 PROCALCITONIN (PCT): CPT | Performed by: EMERGENCY MEDICINE

## 2021-06-03 PROCEDURE — 99232 SBSQ HOSP IP/OBS MODERATE 35: CPT | Performed by: PHYSICIAN ASSISTANT

## 2021-06-03 PROCEDURE — 80053 COMPREHEN METABOLIC PANEL: CPT | Performed by: INTERNAL MEDICINE

## 2021-06-03 PROCEDURE — 94640 AIRWAY INHALATION TREATMENT: CPT

## 2021-06-03 PROCEDURE — 85025 COMPLETE CBC W/AUTO DIFF WBC: CPT | Performed by: INTERNAL MEDICINE

## 2021-06-03 RX ORDER — LEVALBUTEROL 1.25 MG/.5ML
SOLUTION, CONCENTRATE RESPIRATORY (INHALATION)
Status: COMPLETED
Start: 2021-06-03 | End: 2021-06-03

## 2021-06-03 RX ORDER — SODIUM CHLORIDE FOR INHALATION 0.9 %
VIAL, NEBULIZER (ML) INHALATION
Status: COMPLETED
Start: 2021-06-03 | End: 2021-06-03

## 2021-06-03 RX ORDER — MIDODRINE HYDROCHLORIDE 5 MG/1
5 TABLET ORAL
Status: DISCONTINUED | OUTPATIENT
Start: 2021-06-03 | End: 2021-06-04

## 2021-06-03 RX ADMIN — MIDODRINE HYDROCHLORIDE 5 MG: 5 TABLET ORAL at 16:28

## 2021-06-03 RX ADMIN — ISODIUM CHLORIDE 3 ML: 0.03 SOLUTION RESPIRATORY (INHALATION) at 04:42

## 2021-06-03 RX ADMIN — HEPARIN SODIUM 5000 UNITS: 5000 INJECTION INTRAVENOUS; SUBCUTANEOUS at 13:36

## 2021-06-03 RX ADMIN — SODIUM CHLORIDE 1000 ML: 0.9 INJECTION, SOLUTION INTRAVENOUS at 03:24

## 2021-06-03 RX ADMIN — LEVALBUTEROL HYDROCHLORIDE 1.25 MG: 1.25 SOLUTION, CONCENTRATE RESPIRATORY (INHALATION) at 04:42

## 2021-06-03 RX ADMIN — ACETAMINOPHEN 650 MG: 325 TABLET, FILM COATED ORAL at 12:33

## 2021-06-03 RX ADMIN — CEFEPIME HYDROCHLORIDE 2000 MG: 2 INJECTION, POWDER, FOR SOLUTION INTRAVENOUS at 16:28

## 2021-06-03 RX ADMIN — HEPARIN SODIUM 5000 UNITS: 5000 INJECTION INTRAVENOUS; SUBCUTANEOUS at 06:27

## 2021-06-03 RX ADMIN — CEFEPIME HYDROCHLORIDE 2000 MG: 2 INJECTION, POWDER, FOR SOLUTION INTRAVENOUS at 06:26

## 2021-06-03 RX ADMIN — MIDODRINE HYDROCHLORIDE 5 MG: 5 TABLET ORAL at 12:33

## 2021-06-03 RX ADMIN — HEPARIN SODIUM 5000 UNITS: 5000 INJECTION INTRAVENOUS; SUBCUTANEOUS at 22:03

## 2021-06-03 NOTE — ASSESSMENT & PLAN NOTE
· Sepsis due to possible pneumonia versus SIRS from lymphoma  · Noted to have elevated lactic acid together with fever and tachycardia on admission  · Urine culture without acute infection  · Chest x-ray no focal opacity to suggest pneumonia  Mild bilateral hilar fullness suspected left greater than right suspecting underlying hilar lymphadenopathy  · Noted to have elevated procalcitonin of 0 63    With repeat of 8 38  · Blood cultures pending  · Started on empiric cefepime-will continue at this time  · Will have Infectious Disease see in consultation

## 2021-06-03 NOTE — PLAN OF CARE
Problem: Potential for Falls  Goal: Patient will remain free of falls  Description: INTERVENTIONS:  - Assess patient frequently for physical needs  -  Identify cognitive and physical deficits and behaviors that affect risk of falls    -  [place] fall precautions as indicated by assessment   - Educate patient/family on patient safety including physical limitations  - Instruct patient to call for assistance with activity based on assessment  - Modify environment to reduce risk of injury  - Consider OT/PT consult to assist with strengthening/mobility  Outcome: Progressing     Problem: PAIN - ADULT  Goal: Verbalizes/displays adequate comfort level or baseline comfort level  Description: Interventions:  - Encourage patient to monitor pain and request assistance  - Assess pain using appropriate pain scale  - Administer analgesics based on type and severity of pain and evaluate response  - Implement non-pharmacological measures as appropriate and evaluate response  - Consider cultural and social influences on pain and pain management  - Notify physician/advanced practitioner if interventions unsuccessful or patient reports new pain  Outcome: Progressing     Problem: INFECTION - ADULT  Goal: Absence or prevention of progression during hospitalization  Description: INTERVENTIONS:  - Assess and monitor for signs and symptoms of infection  - Monitor lab/diagnostic results  - Monitor all insertion sites, i e  indwelling lines, tubes, and drains  - Monitor endotracheal if appropriate and nasal secretions for changes in amount and color  - [place] appropriate cooling/warming therapies per order  - Administer medications as ordered  - Instruct and encourage patient and family to use good hand hygiene technique  - Identify and instruct in appropriate isolation precautions for identified infection/condition  Outcome: Progressing  Goal: Absence of fever/infection during neutropenic period  Description: INTERVENTIONS:  - Monitor WBC    Outcome: Progressing     Problem: SAFETY ADULT  Goal: Patient will remain free of falls  Description: INTERVENTIONS:  - Assess patient frequently for physical needs  -  Identify cognitive and physical deficits and behaviors that affect risk of falls    -  [place] fall precautions as indicated by assessment   - Educate patient/family on patient safety including physical limitations  - Instruct patient to call for assistance with activity based on assessment  - Modify environment to reduce risk of injury  - Consider OT/PT consult to assist with strengthening/mobility  Outcome: Progressing  Goal: Maintain or return to baseline ADL function  Description: INTERVENTIONS:  -  Assess patient's ability to carry out ADLs; assess patient's baseline for ADL function and identify physical deficits which impact ability to perform ADLs (bathing, care of mouth/teeth, toileting, grooming, dressing, etc )  - Assess/evaluate cause of self-care deficits   - Assess range of motion  - Assess patient's mobility; develop plan if impaired  - Assess patient's need for assistive devices and provide as appropriate  - Encourage maximum independence but intervene and supervise when necessary  - Involve family in performance of ADLs  - Assess for home care needs following discharge   - Consider OT consult to assist with ADL evaluation and planning for discharge  - Provide patient education as appropriate  Outcome: Progressing  Goal: Maintain or return mobility status to optimal level  Description: INTERVENTIONS:  - Assess patient's baseline mobility status (ambulation, transfers, stairs, etc )    - Identify cognitive and physical deficits and behaviors that affect mobility  - Identify mobility aids required to assist with transfers and/or ambulation (gait belt, sit-to-stand, lift, walker, cane, etc )  - [place] fall precautions as indicated by assessment  - Record patient progress and toleration of activity level on Mobility SBAR; progress patient to next Phase/Stage  - Instruct patient to call for assistance with activity based on assessment  - Consider rehabilitation consult to assist with strengthening/weightbearing, etc   Outcome: Progressing     Problem: DISCHARGE PLANNING  Goal: Discharge to home or other facility with appropriate resources  Description: INTERVENTIONS:  - Identify barriers to discharge w/patient and caregiver  - Arrange for needed discharge resources and transportation as appropriate  - Identify discharge learning needs (meds, wound care, etc )  - Arrange for interpretive services to assist at discharge as needed  - Refer to Case Management Department for coordinating discharge planning if the patient needs post-hospital services based on physician/advanced practitioner order or complex needs related to functional status, cognitive ability, or social support system  Outcome: Progressing     Problem: Knowledge Deficit  Goal: Patient/family/caregiver demonstrates understanding of disease process, treatment plan, medications, and discharge instructions  Description: Complete learning assessment and assess knowledge base    Interventions:  - Provide teaching at level of understanding  - Provide teaching via preferred learning methods  Outcome: Progressing

## 2021-06-03 NOTE — ASSESSMENT & PLAN NOTE
· Known to hematologist/oncologist at [facility]  · According to his wife and [facility] records he will need immunotherapy , however this has not been started due to insurance issues    · He is high risk for infection due to his lymphoma and immunocompromised state

## 2021-06-03 NOTE — QUICK NOTE
Notified by nurse that he finished his blood transfusion but is tachycardiac at 165, sounds congested , and with fever  Check stat portable cxr r/o pulmonary edema  Check EKG  Give IV cardizem 10 mg x 1

## 2021-06-03 NOTE — PROGRESS NOTES
[address] [place] [address]  Progress Note - Kaushik Rhodes 1955, 77 y o  male MRN: [id number]  Unit/Bed#: E5 -01 Encounter: [id number]  Primary Care Provider: Rogelio Mabry MD   Date and time admitted to hospital: 6/2/2021  3:12 PM    * Sepsis [facility])  Assessment & Plan  · Sepsis due to possible pneumonia versus SIRS from lymphoma  · Noted to have elevated lactic acid together with fever and tachycardia on admission  · Urine culture without acute infection  · Chest x-ray no focal opacity to suggest pneumonia  Mild bilateral hilar fullness suspected left greater than right suspecting underlying hilar lymphadenopathy  · Noted to have elevated procalcitonin of 0 63  With repeat of 8 38  · Blood cultures pending  · Started on empiric cefepime-will continue at this time  · Will have Infectious Disease see in consultation    Hyponatremia  Assessment & Plan  · Mild likely due to underlying malignancy  · Recheck sodium with improvement    Anemia  Assessment & Plan  · Likely secondary to lymphoma  · He has no jaundice/elevated LFTs to suggest hemolysis  · He has no sign of bleeding  · Hemoglobin on admission 6 3   · Status post 1 unit of PRBCs with rise in hemoglobin to 7 2   · Continue to monitor as patient may need another transfusion  · A m  Lab work    Hodgkin lymphoma [facility])  Assessment & Plan  · Known to hematologist/oncologist at [facility]  · According to his wife and [facility] records he will need immunotherapy , however this has not been started due to insurance issues  · He is high risk for infection due to his lymphoma and immunocompromised state        VTE Pharmacologic Prophylaxis:   Pharmacologic: Heparin  Mechanical VTE Prophylaxis in Place: Yes    Discharge Plan:   With need for continued inpatient stay for sepsis and IV antibiotics    Discussions with Specialists or Other Care Team Provider:  Nursing    Education and Discussions with Family / Patient:  Patient, attempted to call wife with  on the line-obtained busy signal x2    Time Spent for Care: 45 minutes  More than 50% of total time spent on counseling and coordination of care as described above  Current Length of Stay: 1 day(s)  Current Patient Status: Inpatient   Code Status: Level 1 - Full Code    Subjective:   Patient resting comfortably in bed  He is an extremely poor historian  He is unable to provide any meaningful history  Attempted to call wife while at bed side with patient along with presence of  however kept getting the busy signal   Patient himself thinks that he is at [place] to name only  States he is here to get better and keep fighting  Objective:     Vitals:   Temp (24hrs), Av 9 °F (37 2 °C), Min:97 2 °F (36 2 °C), Max:102 8 °F (39 3 °C)    Temp:  [97 2 °F (36 2 °C)-102 8 °F (39 3 °C)] 97 9 °F (36 6 °C)  HR:  [] 103  Resp:  [16-22] 18  BP: ()/(46-91) 93/63  SpO2:  [90 %-99 %] 99 %  There is no height or weight on file to calculate BMI  Input and Output Summary (last 24 hours): Intake/Output Summary (Last 24 hours) at 6/3/2021 1628  Last data filed at 2021 2217  Gross per 24 hour   Intake 1138 33 ml   Output 200 ml   Net 938 33 ml       Physical Exam:     Physical Exam  Vitals signs and nursing note reviewed  Constitutional:       Appearance: Normal appearance  Comments: Frail and cachectic-appearing   HENT:      Head: Normocephalic and atraumatic  Eyes:      General: No scleral icterus  Cardiovascular:      Rate and Rhythm: Normal rate and regular rhythm  Pulmonary:      Effort: Pulmonary effort is normal  No respiratory distress  Breath sounds: Normal breath sounds  No stridor  No wheezing or rhonchi  Abdominal:      General: Bowel sounds are normal  There is no distension  Palpations: Abdomen is soft  There is no mass  Tenderness: There is no abdominal tenderness  Hernia: No hernia is present  Musculoskeletal:         General: No swelling  Skin:     General: Skin is warm and dry  Neurological:      Comments: Oriented to person only   Psychiatric:         Mood and Affect: Mood normal          Additional Data:     Labs:    Results from last 7 days   Lab Units 06/03/21  0633   WBC Thousand/uL 6 42   HEMOGLOBIN g/dL 7 2*   HEMATOCRIT % 23 8*   PLATELETS Thousands/uL 414*   NEUTROS PCT % 85*   LYMPHS PCT % 5*   MONOS PCT % 7   EOS PCT % 2     Results from last 7 days   Lab Units 06/03/21  0633   POTASSIUM mmol/L 4 1   CHLORIDE mmol/L 97*   CO2 mmol/L 30   BUN mg/dL 10   CREATININE mg/dL 0 54*   CALCIUM mg/dL 8 5   ALK PHOS U/L 186*   ALT U/L 41   AST U/L 32     Results from last 7 days   Lab Units 06/02/21  1527   INR  1 62*       * I Have Reviewed All Lab Data Listed Above  * Additional Pertinent Lab Tests Reviewed: Jeb 66 Admission Reviewed    Imaging:    Imaging Reports Reviewed Today Include: CXR  Imaging Personally Reviewed by Myself Includes:      Recent Cultures (last 7 days):     Results from last 7 days   Lab Units 06/02/21  1527   BLOOD CULTURE  Received in Microbiology Lab  Culture in Progress  Received in Microbiology Lab  Culture in Progress  Last 24 Hours Medication List:   Current Facility-Administered Medications   Medication Dose Route Frequency Provider Last Rate    acetaminophen  650 mg Oral Q6H PRN Liliam Durham MD      cefepime  2,000 mg Intravenous Q12H Liliam Durham MD 2,000 mg (06/03/21 0626)    heparin (porcine)  5,000 Units Subcutaneous [facility] Liliam Durham MD      midodrine  5 mg Oral TID AC Laura Socks, DO          Today, Patient Was Seen By: Dolph Aschoff, PA-C    ** Please Note: This note has been constructed using a voice recognition system   **

## 2021-06-03 NOTE — NURSING NOTE
Notified on call slim pt's bp 89/59  Bolus ordered for pt  Pt did not tolerate bolus  Pt had exploratory/inspirarory wheezes, congested, cough, and difficulty breathing  Fluids stopped and  Slim notified  New orders obtained

## 2021-06-03 NOTE — ASSESSMENT & PLAN NOTE
· Likely secondary to lymphoma  · He has no jaundice/elevated LFTs to suggest hemolysis  · He has no sign of bleeding  · Hemoglobin on admission 6 3   · Status post 1 unit of PRBCs with rise in hemoglobin to 7 2   · Continue to monitor as patient may need another transfusion  · A m   Lab work non spec st t abn

## 2021-06-03 NOTE — PLAN OF CARE
Problem: Potential for Falls  Goal: Patient will remain free of falls  Description: INTERVENTIONS:  - Assess patient frequently for physical needs  -  Identify cognitive and physical deficits and behaviors that affect risk of falls    -  [place] fall precautions as indicated by assessment   - Educate patient/family on patient safety including physical limitations  - Instruct patient to call for assistance with activity based on assessment  - Modify environment to reduce risk of injury  - Consider OT/PT consult to assist with strengthening/mobility  Outcome: Progressing     Problem: PAIN - ADULT  Goal: Verbalizes/displays adequate comfort level or baseline comfort level  Description: Interventions:  - Encourage patient to monitor pain and request assistance  - Assess pain using appropriate pain scale  - Administer analgesics based on type and severity of pain and evaluate response  - Implement non-pharmacological measures as appropriate and evaluate response  - Consider cultural and social influences on pain and pain management  - Notify physician/advanced practitioner if interventions unsuccessful or patient reports new pain  Outcome: Progressing     Problem: INFECTION - ADULT  Goal: Absence or prevention of progression during hospitalization  Description: INTERVENTIONS:  - Assess and monitor for signs and symptoms of infection  - Monitor lab/diagnostic results  - Monitor all insertion sites, i e  indwelling lines, tubes, and drains  - Monitor endotracheal if appropriate and nasal secretions for changes in amount and color  - [place] appropriate cooling/warming therapies per order  - Administer medications as ordered  - Instruct and encourage patient and family to use good hand hygiene technique  - Identify and instruct in appropriate isolation precautions for identified infection/condition  Outcome: Progressing  Goal: Absence of fever/infection during neutropenic period  Description: INTERVENTIONS:  - Monitor WBC    Outcome: Progressing     Problem: SAFETY ADULT  Goal: Patient will remain free of falls  Description: INTERVENTIONS:  - Assess patient frequently for physical needs  -  Identify cognitive and physical deficits and behaviors that affect risk of falls    -  [place] fall precautions as indicated by assessment   - Educate patient/family on patient safety including physical limitations  - Instruct patient to call for assistance with activity based on assessment  - Modify environment to reduce risk of injury  - Consider OT/PT consult to assist with strengthening/mobility  Outcome: Progressing  Goal: Maintain or return to baseline ADL function  Description: INTERVENTIONS:  -  Assess patient's ability to carry out ADLs; assess patient's baseline for ADL function and identify physical deficits which impact ability to perform ADLs (bathing, care of mouth/teeth, toileting, grooming, dressing, etc )  - Assess/evaluate cause of self-care deficits   - Assess range of motion  - Assess patient's mobility; develop plan if impaired  - Assess patient's need for assistive devices and provide as appropriate  - Encourage maximum independence but intervene and supervise when necessary  - Involve family in performance of ADLs  - Assess for home care needs following discharge   - Consider OT consult to assist with ADL evaluation and planning for discharge  - Provide patient education as appropriate  Outcome: Progressing  Goal: Maintain or return mobility status to optimal level  Description: INTERVENTIONS:  - Assess patient's baseline mobility status (ambulation, transfers, stairs, etc )    - Identify cognitive and physical deficits and behaviors that affect mobility  - Identify mobility aids required to assist with transfers and/or ambulation (gait belt, sit-to-stand, lift, walker, cane, etc )  - [place] fall precautions as indicated by assessment  - Record patient progress and toleration of activity level on Mobility SBAR; progress patient to next Phase/Stage  - Instruct patient to call for assistance with activity based on assessment  - Consider rehabilitation consult to assist with strengthening/weightbearing, etc   Outcome: Progressing     Problem: DISCHARGE PLANNING  Goal: Discharge to home or other facility with appropriate resources  Description: INTERVENTIONS:  - Identify barriers to discharge w/patient and caregiver  - Arrange for needed discharge resources and transportation as appropriate  - Identify discharge learning needs (meds, wound care, etc )  - Arrange for interpretive services to assist at discharge as needed  - Refer to Case Management Department for coordinating discharge planning if the patient needs post-hospital services based on physician/advanced practitioner order or complex needs related to functional status, cognitive ability, or social support system  Outcome: Progressing     Problem: Knowledge Deficit  Goal: Patient/family/caregiver demonstrates understanding of disease process, treatment plan, medications, and discharge instructions  Description: Complete learning assessment and assess knowledge base    Interventions:  - Provide teaching at level of understanding  - Provide teaching via preferred learning methods  Outcome: Progressing     Problem: Prexisting or High Potential for Compromised Skin Integrity  Goal: Skin integrity is maintained or improved  Description: INTERVENTIONS:  - Identify patients at risk for skin breakdown  - Assess and monitor skin integrity  - Assess and monitor nutrition and hydration status  - Monitor labs   - Assess for incontinence   - Turn and reposition patient  - Assist with mobility/ambulation  - Relieve pressure over bony prominences  - Avoid friction and shearing  - Provide appropriate hygiene as needed including keeping skin clean and dry  - Evaluate need for skin moisturizer/barrier cream  - Collaborate with interdisciplinary team   - Patient/family teaching  - Consider wound care consult   Outcome: Progressing     Problem: Nutrition/Hydration-ADULT  Goal: Nutrient/Hydration intake appropriate for improving, restoring or maintaining nutritional needs  Description: Monitor and assess patient's nutrition/hydration status for malnutrition  Collaborate with interdisciplinary team and initiate plan and interventions as ordered  Monitor patient's weight and dietary intake as ordered or per policy  Utilize nutrition screening tool and intervene as necessary  Determine patient's food preferences and provide high-protein, high-caloric foods as appropriate       INTERVENTIONS:  - Monitor oral intake, urinary output, labs, and treatment plans  - Assess nutrition and hydration status and recommend course of action  - Evaluate amount of meals eaten  - Assist patient with eating if necessary   - Allow adequate time for meals  - Recommend/ encourage appropriate diets, oral nutritional supplements, and vitamin/mineral supplements  - Order, calculate, and assess calorie counts as needed  - Recommend, monitor, and adjust tube feedings and TPN/PPN based on assessed needs  - Assess need for intravenous fluids  - Provide specific nutrition/hydration education as appropriate  - Include patient/family/caregiver in decisions related to nutrition  Outcome: Progressing

## 2021-06-04 ENCOUNTER — APPOINTMENT (INPATIENT)
Dept: CT IMAGING | Facility: HOSPITAL | Age: 66
DRG: 840 | End: 2021-06-04
Payer: MEDICARE

## 2021-06-04 PROBLEM — R65.10: Status: ACTIVE | Noted: 2021-06-02

## 2021-06-04 PROBLEM — C81.93: Chronic | Status: ACTIVE | Noted: 2021-06-02

## 2021-06-04 PROBLEM — E43 SEVERE PROTEIN-CALORIE MALNUTRITION (HCC): Status: ACTIVE | Noted: 2021-06-04

## 2021-06-04 PROBLEM — C81.93: Status: ACTIVE | Noted: 2021-06-02

## 2021-06-04 LAB
ALBUMIN SERPL BCP-MCNC: 1.4 G/DL (ref 3.5–5)
ALP SERPL-CCNC: 151 U/L (ref 46–116)
ALT SERPL W P-5'-P-CCNC: 34 U/L (ref 12–78)
ANION GAP SERPL CALCULATED.3IONS-SCNC: 10 MMOL/L (ref 4–13)
APTT PPP: 203 SECONDS (ref 23–37)
APTT PPP: 76 SECONDS (ref 23–37)
AST SERPL W P-5'-P-CCNC: 23 U/L (ref 5–45)
BILIRUB SERPL-MCNC: 0.61 MG/DL (ref 0.2–1)
BUN SERPL-MCNC: 14 MG/DL (ref 5–25)
CALCIUM ALBUM COR SERPL-MCNC: 10.8 MG/DL (ref 8.3–10.1)
CALCIUM SERPL-MCNC: 8.7 MG/DL (ref 8.3–10.1)
CHLORIDE SERPL-SCNC: 95 MMOL/L (ref 100–108)
CO2 SERPL-SCNC: 30 MMOL/L (ref 21–32)
CREAT SERPL-MCNC: 0.58 MG/DL (ref 0.6–1.3)
ERYTHROCYTE [DISTWIDTH] IN BLOOD BY AUTOMATED COUNT: 21.3 % (ref 11.6–15.1)
GFR SERPL CREATININE-BSD FRML MDRD: 106 ML/MIN/1.73SQ M
GLUCOSE SERPL-MCNC: 91 MG/DL (ref 65–140)
HCT VFR BLD AUTO: 24.9 % (ref 36.5–49.3)
HGB BLD-MCNC: 7.6 G/DL (ref 12–17)
INR PPP: 1.79 (ref 0.84–1.19)
MAGNESIUM SERPL-MCNC: 1.8 MG/DL (ref 1.6–2.6)
MCH RBC QN AUTO: 22 PG (ref 26.8–34.3)
MCHC RBC AUTO-ENTMCNC: 30.5 G/DL (ref 31.4–37.4)
MCV RBC AUTO: 72 FL (ref 82–98)
PLATELET # BLD AUTO: 431 THOUSANDS/UL (ref 149–390)
PMV BLD AUTO: 9.3 FL (ref 8.9–12.7)
POTASSIUM SERPL-SCNC: 3.8 MMOL/L (ref 3.5–5.3)
PROCALCITONIN SERPL-MCNC: 29.79 NG/ML
PROT SERPL-MCNC: 6.1 G/DL (ref 6.4–8.2)
PROTHROMBIN TIME: 20.5 SECONDS (ref 11.6–14.5)
RBC # BLD AUTO: 3.45 MILLION/UL (ref 3.88–5.62)
SODIUM SERPL-SCNC: 135 MMOL/L (ref 136–145)
WBC # BLD AUTO: 5.41 THOUSAND/UL (ref 4.31–10.16)

## 2021-06-04 PROCEDURE — 85610 PROTHROMBIN TIME: CPT | Performed by: PHYSICIAN ASSISTANT

## 2021-06-04 PROCEDURE — 85027 COMPLETE CBC AUTOMATED: CPT | Performed by: PHYSICIAN ASSISTANT

## 2021-06-04 PROCEDURE — 85730 THROMBOPLASTIN TIME PARTIAL: CPT | Performed by: HOSPITALIST

## 2021-06-04 PROCEDURE — 85730 THROMBOPLASTIN TIME PARTIAL: CPT | Performed by: PHYSICIAN ASSISTANT

## 2021-06-04 PROCEDURE — 71260 CT THORAX DX C+: CPT

## 2021-06-04 PROCEDURE — G1004 CDSM NDSC: HCPCS

## 2021-06-04 PROCEDURE — 84145 PROCALCITONIN (PCT): CPT | Performed by: PHYSICIAN ASSISTANT

## 2021-06-04 PROCEDURE — 74177 CT ABD & PELVIS W/CONTRAST: CPT

## 2021-06-04 PROCEDURE — 83735 ASSAY OF MAGNESIUM: CPT | Performed by: PHYSICIAN ASSISTANT

## 2021-06-04 PROCEDURE — 99223 1ST HOSP IP/OBS HIGH 75: CPT | Performed by: INTERNAL MEDICINE

## 2021-06-04 PROCEDURE — 80053 COMPREHEN METABOLIC PANEL: CPT | Performed by: PHYSICIAN ASSISTANT

## 2021-06-04 PROCEDURE — 99232 SBSQ HOSP IP/OBS MODERATE 35: CPT | Performed by: PHYSICIAN ASSISTANT

## 2021-06-04 RX ORDER — MIDODRINE HYDROCHLORIDE 5 MG/1
5 TABLET ORAL ONCE
Status: COMPLETED | OUTPATIENT
Start: 2021-06-04 | End: 2021-06-04

## 2021-06-04 RX ORDER — HEPARIN SODIUM 1000 [USP'U]/ML
4400 INJECTION, SOLUTION INTRAVENOUS; SUBCUTANEOUS ONCE
Status: COMPLETED | OUTPATIENT
Start: 2021-06-04 | End: 2021-06-04

## 2021-06-04 RX ORDER — MIDODRINE HYDROCHLORIDE 5 MG/1
10 TABLET ORAL
Status: DISCONTINUED | OUTPATIENT
Start: 2021-06-05 | End: 2021-06-05

## 2021-06-04 RX ORDER — HEPARIN SODIUM 1000 [USP'U]/ML
4400 INJECTION, SOLUTION INTRAVENOUS; SUBCUTANEOUS
Status: DISCONTINUED | OUTPATIENT
Start: 2021-06-04 | End: 2021-06-07

## 2021-06-04 RX ORDER — HEPARIN SODIUM 1000 [USP'U]/ML
2200 INJECTION, SOLUTION INTRAVENOUS; SUBCUTANEOUS
Status: DISCONTINUED | OUTPATIENT
Start: 2021-06-04 | End: 2021-06-07

## 2021-06-04 RX ORDER — HEPARIN SODIUM 10000 [USP'U]/100ML
3-30 INJECTION, SOLUTION INTRAVENOUS
Status: DISCONTINUED | OUTPATIENT
Start: 2021-06-04 | End: 2021-06-07

## 2021-06-04 RX ORDER — VANCOMYCIN HYDROCHLORIDE 1 G/200ML
1000 INJECTION, SOLUTION INTRAVENOUS EVERY 12 HOURS
Status: DISCONTINUED | OUTPATIENT
Start: 2021-06-04 | End: 2021-06-05

## 2021-06-04 RX ADMIN — CEFEPIME HYDROCHLORIDE 2000 MG: 2 INJECTION, POWDER, FOR SOLUTION INTRAVENOUS at 20:02

## 2021-06-04 RX ADMIN — MIDODRINE HYDROCHLORIDE 5 MG: 5 TABLET ORAL at 11:11

## 2021-06-04 RX ADMIN — HEPARIN SODIUM 5000 UNITS: 5000 INJECTION INTRAVENOUS; SUBCUTANEOUS at 16:56

## 2021-06-04 RX ADMIN — MIDODRINE HYDROCHLORIDE 5 MG: 5 TABLET ORAL at 16:56

## 2021-06-04 RX ADMIN — HEPARIN SODIUM 4400 UNITS: 1000 INJECTION INTRAVENOUS; SUBCUTANEOUS at 19:34

## 2021-06-04 RX ADMIN — MIDODRINE HYDROCHLORIDE 5 MG: 5 TABLET ORAL at 19:25

## 2021-06-04 RX ADMIN — HEPARIN SODIUM 5000 UNITS: 5000 INJECTION INTRAVENOUS; SUBCUTANEOUS at 06:02

## 2021-06-04 RX ADMIN — HEPARIN SODIUM 18 UNITS/KG/HR: 10000 INJECTION, SOLUTION INTRAVENOUS at 19:22

## 2021-06-04 RX ADMIN — SODIUM CHLORIDE 1000 ML: 0.9 INJECTION, SOLUTION INTRAVENOUS at 20:11

## 2021-06-04 RX ADMIN — MIDODRINE HYDROCHLORIDE 5 MG: 5 TABLET ORAL at 06:02

## 2021-06-04 RX ADMIN — IOHEXOL 100 ML: 350 INJECTION, SOLUTION INTRAVENOUS at 14:41

## 2021-06-04 RX ADMIN — CEFEPIME HYDROCHLORIDE 2000 MG: 2 INJECTION, POWDER, FOR SOLUTION INTRAVENOUS at 05:01

## 2021-06-04 RX ADMIN — VANCOMYCIN HYDROCHLORIDE 1000 MG: 1 INJECTION, SOLUTION INTRAVENOUS at 16:46

## 2021-06-04 NOTE — ASSESSMENT & PLAN NOTE
· Sepsis due to possible pneumonia versus SIRS from lymphoma  · Noted to have elevated lactic acid together with fever and tachycardia on admission  · Patient remains a tachycardia of 120, febrile 100 4 and elevated lactic acid  · Urine culture without acute infection  · Chest x-ray no focal opacity to suggest pneumonia  Mild bilateral hilar fullness suspected left greater than right suspecting underlying hilar lymphadenopathy  · Noted to have elevated procalcitonin of 0 63    With repeat of 8 38  · Blood cultures pending  · Started on empiric cefepime-will continue at this time  · Will have Infectious Disease see in consultation, appreciate recommendations

## 2021-06-04 NOTE — MALNUTRITION/BMI
This medical record reflects one or more clinical indicators suggestive of malnutrition and/or morbid obesity  Malnutrition Findings:   Adult Malnutrition type: Chronic illness  Adult Degree of Malnutrition: Other severe protein calorie malnutrition  Malnutrition Characteristics: Fat loss, Muscle loss, Inadequate energy    Treated with diet and nutritional supplement TID  BMI Findings:  Adult BMI Classifications: Underweight < 18 5     There is no height or weight on file to calculate BMI  See Nutrition note dated 6/4/2021 for additional details  Completed nutrition assessment is viewable in the nutrition documentation

## 2021-06-04 NOTE — PROGRESS NOTES
[address] [place] [address]  Progress Note - Ga Guan 1955, 77 y o  male MRN: [id number]  Unit/Bed#: E5 -01 Encounter: [id number]  Primary Care Provider: Bertha Zimmerman MD   Date and time admitted to hospital: 6/2/2021  3:12 PM    * Systemic inflammatory response syndrome (SIRS) due to noninfectious process [facility])  Assessment & Plan  · Likely SIRS from B symptoms due to lymphoma  · However, noted to have elevated lactic acid together with fever and tachycardia on admission  · Patient remains a tachycardia of 120, febrile 100 4 and elevated lactic acid  · Urine culture without acute infection  · Chest x-ray no focal opacity to suggest pneumonia  Mild bilateral hilar fullness suspected left greater than right suspecting underlying hilar lymphadenopathy  · Noted to have elevated procalcitonin of 0 63  With repeat of 8 38  · Blood cultures negative at 24 hrs  · Started on empiric cefepime-will continue at this time given elevated procal  · Will have Infectious Disease see in consultation, appreciate recommendations  · Infectious disease suspect this is likely secondary to stage IV Hodgkin lymphoma with metastasis, not infectious   · Patient admitted to [facility] approximately a week and half ago for similar symptoms  · Will obtain repeat CT chest abdomen pelvis with contrast for further evaluation of disease extent  · Consult heme/Onc    Anemia  Assessment & Plan  · Likely secondary to lymphoma  · He has no jaundice/elevated LFTs to suggest hemolysis  · He has no sign of bleeding  · Hemoglobin on admission 6 3   · Status post 1 unit of PRBCs on 06/02 with rise in hemoglobin to 7 2   · Hemoglobin remained stable most recent 7 6  · Continue to monitor as patient may need another transfusion  · A m   Lab work    Hodgkin lymphoma of intra-abdominal lymph nodes ([facility] [address] )  Assessment & Plan  · Stage IV B  · Known to hematologist/oncologist at [facility]  · According to his wife and [facility] records he will need immunotherapy , however this has not been started due to insurance issues  · He is high risk for infection due to his lymphoma and immunocompromised state  · Will obtain repeat CT chest abdomen pelvis with contrast for further evaluation of extent of disease   · Consult heme/Onc      VTE Pharmacologic Prophylaxis:   Pharmacologic: Heparin  Mechanical VTE Prophylaxis in Place: Yes    Patient Centered Rounds: I have performed bedside rounds with nursing staff today  Discussions with Specialists or Other Care Team Provider:  Infectious    Education and Discussions with Family / Patient:  Discussed plan of care with patient and patient's family member at bedside using a   Answered any questions  Time Spent for Care: 20 minutes  More than 50% of total time spent on counseling and coordination of care as described above  Current Length of Stay: 2 day(s)    Current Patient Status: Inpatient   Certification Statement: The patient will continue to require additional inpatient hospital stay due to Continued IV antibiotics    Discharge Plan:  Likely within 24-48 hours pending clinical improvement    Code Status: Level 1 - Full Code      Subjective:   Patient states that he is feeling okay today  Denies any chest pain, shortness of breath, nausea, vomiting, diarrhea, constipation  Patient is baseline confused  States that he thinks that he is at [facility]  Is not oriented to time  Unable to provide much history  Objective:     Vitals:   Temp (24hrs), Av 1 °F (37 3 °C), Min:97 9 °F (36 6 °C), Max:100 4 °F (38 °C)    Temp:  [97 9 °F (36 6 °C)-100 4 °F (38 °C)] 100 4 °F (38 °C)  HR:  [103-120] 120  Resp:  [16-18] 18  BP: ()/(50-67) 101/67  SpO2:  [94 %-99 %] 95 %  There is no height or weight on file to calculate BMI       Input and Output Summary (last 24 hours):     No intake or output data in the 24 hours ending 21 1344    Physical Exam: Physical Exam  Vitals signs and nursing note reviewed  Constitutional:       General: He is not in acute distress  Appearance: He is well-developed  He is ill-appearing  He is not toxic-appearing or diaphoretic  Comments: Frail and cachectic  HENT:      Head: Normocephalic and atraumatic  Eyes:      General: No scleral icterus  Conjunctiva/sclera: Conjunctivae normal    Neck:      Musculoskeletal: Neck supple  Cardiovascular:      Rate and Rhythm: Regular rhythm  Tachycardia present  Heart sounds: No murmur  No friction rub  No gallop  Pulmonary:      Effort: Pulmonary effort is normal  No respiratory distress  Breath sounds: No stridor  Wheezing present  No rhonchi or rales  Comments: With bilateral expiratory wheezing  Chest:      Chest wall: No tenderness  Abdominal:      General: Abdomen is flat  Bowel sounds are normal       Palpations: Abdomen is soft  Tenderness: There is no abdominal tenderness  Musculoskeletal:      Right lower leg: No edema  Left lower leg: No edema  Skin:     General: Skin is warm and dry  Capillary Refill: Capillary refill takes less than 2 seconds  Neurological:      General: No focal deficit present  Mental Status: He is alert  Mental status is at baseline  He is disoriented  Cranial Nerves: No cranial nerve deficit         Additional Data:     Labs:    Results from last 7 days   Lab Units 06/04/21  0501 06/03/21  0633   WBC Thousand/uL 5 41 6 42   HEMOGLOBIN g/dL 7 6* 7 2*   HEMATOCRIT % 24 9* 23 8*   PLATELETS Thousands/uL 431* 414*   NEUTROS PCT %  --  85*   LYMPHS PCT %  --  5*   MONOS PCT %  --  7   EOS PCT %  --  2     Results from last 7 days   Lab Units 06/04/21  0501   SODIUM mmol/L 135*   POTASSIUM mmol/L 3 8   CHLORIDE mmol/L 95*   CO2 mmol/L 30   BUN mg/dL 14   CREATININE mg/dL 0 58*   ANION GAP mmol/L 10   CALCIUM mg/dL 8 7   ALBUMIN g/dL 1 4*   TOTAL BILIRUBIN mg/dL 0 61   ALK PHOS U/L 151*   ALT U/L 34   AST U/L 23   GLUCOSE RANDOM mg/dL 91     Results from last 7 days   Lab Units 06/02/21  1527   INR  1 62*             Results from last 7 days   Lab Units 06/03/21  0633 06/02/21  1724 06/02/21  1527   LACTIC ACID mmol/L  --  1 6 3 4*   PROCALCITONIN ng/ml 8 38*  --  0 63*           * I Have Reviewed All Lab Data Listed Above  * Additional Pertinent Lab Tests Reviewed: [address] Admission Reviewed    Imaging:    Imaging Reports Reviewed Today Include:  Chest x-ray  Imaging Personally Reviewed by Myself Includes:  Chest x-ray    Recent Cultures (last 7 days):     Results from last 7 days   Lab Units 06/02/21  1527   BLOOD CULTURE  No Growth at 24 hrs  No Growth at 24 hrs  Last 24 Hours Medication List:   Current Facility-Administered Medications   Medication Dose Route Frequency Provider Last Rate    acetaminophen  650 mg Oral Q6H PRN Hector Burns MD      cefepime  2,000 mg Intravenous Q12H Hector Burns MD 2,000 mg (06/04/21 0501)    heparin (porcine)  5,000 Units Subcutaneous [facility] Hector Burns MD      midodrine  5 mg Oral TID AC Marlen Tirado DO          Today, Patient Was Seen By: Ray Murdock PA-C    ** Please Note: Dictation voice to text software may have been used in the creation of this document   **

## 2021-06-04 NOTE — PROGRESS NOTES
Vancomycin Assessment    Reggie Arellano is a 77 y o  male who is currently receiving vancomycin 1000mg IV every 12 hrs for Pneumonia     Relevant clinical data and objective history reviewed:  Creatinine   Date Value Ref Range Status   06/04/2021 0 58 (L) 0 60 - 1 30 mg/dL Final     Comment:     Standardized to IDMS reference method   06/03/2021 0 54 (L) 0 60 - 1 30 mg/dL Final     Comment:     Standardized to IDMS reference method   06/02/2021 0 57 (L) 0 60 - 1 30 mg/dL Final     Comment:     Standardized to IDMS reference method     BP 95/64   Pulse (!) 117   Temp 98 1 °F (36 7 °C)   Resp 21   SpO2 91%   I/O last 3 completed shifts:  In: -   Out: 200 [Urine:200]  Lab Results   Component Value Date/Time    BUN 14 06/04/2021 05:01 AM    WBC 5 41 06/04/2021 05:01 AM    HGB 7 6 (L) 06/04/2021 05:01 AM    HCT 24 9 (L) 06/04/2021 05:01 AM    MCV 72 (L) 06/04/2021 05:01 AM     (H) 06/04/2021 05:01 AM     Temp Readings from Last 3 Encounters:   06/04/21 98 1 °F (36 7 °C)   09/14/17 98 5 °F (36 9 °C) (Temporal)     Vancomycin Days of Therapy: 1    Assessment/Plan  The patient is currently on vancomycin utilizing scheduled dosing based on actual body weight  Baseline risks associated with therapy include: concomitant nephrotoxic medications, advanced age, and dehydration  The patient is currently receiving 1000mg IV every 12 hrs and is clinically appropriate and dose will be continued  Pharmacy will also follow closely for s/sx of nephrotoxicity, infusion reactions, and appropriateness of therapy  BMP and CBC will be ordered per protocol  Plan for trough as patient approaches steady state, prior to the 4th  dose at approximately [id number] on 6/6/21  Due to infection severity, will target a trough of 15-20 (appropriate for most indications)   Pharmacy will continue to follow the patients culture results and clinical progress daily      Angela Chiu Pharmacist

## 2021-06-04 NOTE — PLAN OF CARE
Problem: Potential for Falls  Goal: Patient will remain free of falls  Description: INTERVENTIONS:  - Assess patient frequently for physical needs  -  Identify cognitive and physical deficits and behaviors that affect risk of falls    -  [place] fall precautions as indicated by assessment   - Educate patient/family on patient safety including physical limitations  - Instruct patient to call for assistance with activity based on assessment  - Modify environment to reduce risk of injury  - Consider OT/PT consult to assist with strengthening/mobility  Outcome: Progressing     Problem: PAIN - ADULT  Goal: Verbalizes/displays adequate comfort level or baseline comfort level  Description: Interventions:  - Encourage patient to monitor pain and request assistance  - Assess pain using appropriate pain scale  - Administer analgesics based on type and severity of pain and evaluate response  - Implement non-pharmacological measures as appropriate and evaluate response  - Consider cultural and social influences on pain and pain management  - Notify physician/advanced practitioner if interventions unsuccessful or patient reports new pain  Outcome: Progressing     Problem: INFECTION - ADULT  Goal: Absence or prevention of progression during hospitalization  Description: INTERVENTIONS:  - Assess and monitor for signs and symptoms of infection  - Monitor lab/diagnostic results  - Monitor all insertion sites, i e  indwelling lines, tubes, and drains  - Monitor endotracheal if appropriate and nasal secretions for changes in amount and color  - [place] appropriate cooling/warming therapies per order  - Administer medications as ordered  - Instruct and encourage patient and family to use good hand hygiene technique  - Identify and instruct in appropriate isolation precautions for identified infection/condition  Outcome: Progressing  Goal: Absence of fever/infection during neutropenic period  Description: INTERVENTIONS:  - Monitor WBC    Outcome: Progressing     Problem: SAFETY ADULT  Goal: Patient will remain free of falls  Description: INTERVENTIONS:  - Assess patient frequently for physical needs  -  Identify cognitive and physical deficits and behaviors that affect risk of falls    -  [place] fall precautions as indicated by assessment   - Educate patient/family on patient safety including physical limitations  - Instruct patient to call for assistance with activity based on assessment  - Modify environment to reduce risk of injury  - Consider OT/PT consult to assist with strengthening/mobility  Outcome: Progressing  Goal: Maintain or return to baseline ADL function  Description: INTERVENTIONS:  -  Assess patient's ability to carry out ADLs; assess patient's baseline for ADL function and identify physical deficits which impact ability to perform ADLs (bathing, care of mouth/teeth, toileting, grooming, dressing, etc )  - Assess/evaluate cause of self-care deficits   - Assess range of motion  - Assess patient's mobility; develop plan if impaired  - Assess patient's need for assistive devices and provide as appropriate  - Encourage maximum independence but intervene and supervise when necessary  - Involve family in performance of ADLs  - Assess for home care needs following discharge   - Consider OT consult to assist with ADL evaluation and planning for discharge  - Provide patient education as appropriate  Outcome: Progressing  Goal: Maintain or return mobility status to optimal level  Description: INTERVENTIONS:  - Assess patient's baseline mobility status (ambulation, transfers, stairs, etc )    - Identify cognitive and physical deficits and behaviors that affect mobility  - Identify mobility aids required to assist with transfers and/or ambulation (gait belt, sit-to-stand, lift, walker, cane, etc )  - [place] fall precautions as indicated by assessment  - Record patient progress and toleration of activity level on Mobility SBAR; progress patient to next Phase/Stage  - Instruct patient to call for assistance with activity based on assessment  - Consider rehabilitation consult to assist with strengthening/weightbearing, etc   Outcome: Progressing     Problem: DISCHARGE PLANNING  Goal: Discharge to home or other facility with appropriate resources  Description: INTERVENTIONS:  - Identify barriers to discharge w/patient and caregiver  - Arrange for needed discharge resources and transportation as appropriate  - Identify discharge learning needs (meds, wound care, etc )  - Arrange for interpretive services to assist at discharge as needed  - Refer to Case Management Department for coordinating discharge planning if the patient needs post-hospital services based on physician/advanced practitioner order or complex needs related to functional status, cognitive ability, or social support system  Outcome: Progressing     Problem: Knowledge Deficit  Goal: Patient/family/caregiver demonstrates understanding of disease process, treatment plan, medications, and discharge instructions  Description: Complete learning assessment and assess knowledge base    Interventions:  - Provide teaching at level of understanding  - Provide teaching via preferred learning methods  Outcome: Progressing     Problem: Prexisting or High Potential for Compromised Skin Integrity  Goal: Skin integrity is maintained or improved  Description: INTERVENTIONS:  - Identify patients at risk for skin breakdown  - Assess and monitor skin integrity  - Assess and monitor nutrition and hydration status  - Monitor labs   - Assess for incontinence   - Turn and reposition patient  - Assist with mobility/ambulation  - Relieve pressure over bony prominences  - Avoid friction and shearing  - Provide appropriate hygiene as needed including keeping skin clean and dry  - Evaluate need for skin moisturizer/barrier cream  - Collaborate with interdisciplinary team   - Patient/family teaching  - Consider wound care consult   Outcome: Progressing     Problem: Nutrition/Hydration-ADULT  Goal: Nutrient/Hydration intake appropriate for improving, restoring or maintaining nutritional needs  Description: Monitor and assess patient's nutrition/hydration status for malnutrition  Collaborate with interdisciplinary team and initiate plan and interventions as ordered  Monitor patient's weight and dietary intake as ordered or per policy  Utilize nutrition screening tool and intervene as necessary  Determine patient's food preferences and provide high-protein, high-caloric foods as appropriate       INTERVENTIONS:  - Monitor oral intake, urinary output, labs, and treatment plans  - Assess nutrition and hydration status and recommend course of action  - Evaluate amount of meals eaten  - Assist patient with eating if necessary   - Allow adequate time for meals  - Recommend/ encourage appropriate diets, oral nutritional supplements, and vitamin/mineral supplements  - Order, calculate, and assess calorie counts as needed  - Recommend, monitor, and adjust tube feedings and TPN/PPN based on assessed needs  - Assess need for intravenous fluids  - Provide specific nutrition/hydration education as appropriate  - Include patient/family/caregiver in decisions related to nutrition  Outcome: Progressing

## 2021-06-04 NOTE — ASSESSMENT & PLAN NOTE
· Known to hematologist/oncologist at [facility]  · According to his wife and [facility] records he will need immunotherapy , however this has not been started due to insurance issues    · He is high risk for infection due to his lymphoma and immunocompromised state

## 2021-06-04 NOTE — ASSESSMENT & PLAN NOTE
· Mild likely due to underlying malignancy  · Recheck sodium with improvement  · Most recent sodium 135  · BMP in a m

## 2021-06-04 NOTE — NUTRITION
06/04/21 1424   Recommendations/Interventions   Summary Suspect poor po as well as hypermetabloic CA   No new hospital wt as yet but pt w/ s/s of muscle and fat wasting  Pt has been thin for sometime but now not able to maintain wt  RD will add nutrition supplement (pt does not like sweet) and suggest consider EN for caloric support as pt not able to consume enough to maintain wt  Malnutrition/BMI Present Yes   Adult Malnutrition type Chronic illness   Adult Degree of Malnutrition Other severe protein calorie malnutrition   Malnutrition Characteristics Fat loss;Muscle loss; Inadequate energy   Adult BMI Classifications Underweight < 18 5   Interventions Initiate Daily Weight;Diet: continued as ordered; Supplement initiate  (RD will add ensure pudding TID,  Suggest consider peg tube EN feeding for nutrition support )

## 2021-06-04 NOTE — ASSESSMENT & PLAN NOTE
· Stage IV B  · Known to hematologist/oncologist at [facility]  · According to his wife and [facility] records he will need immunotherapy , however this has not been started due to insurance issues    · He is high risk for infection due to his lymphoma and immunocompromised state  · Will obtain repeat CT chest abdomen pelvis with contrast for further evaluation of extent of disease   · Consult heme/Onc

## 2021-06-04 NOTE — PROGRESS NOTES
[address] [place] [address]  Progress Note - Sari Alexander 1955, 77 y o  male MRN: [id number]  Unit/Bed#: E5 -01 Encounter: [id number]  Primary Care Provider: Conrado Montgomery MD   Date and time admitted to hospital: 6/2/2021  3:12 PM    * Sepsis [facility])  Assessment & Plan  · Sepsis due to possible pneumonia versus SIRS from lymphoma  · Noted to have elevated lactic acid together with fever and tachycardia on admission  · Patient remains a tachycardia of 120, febrile 100 4 and elevated lactic acid  · Urine culture without acute infection  · Chest x-ray no focal opacity to suggest pneumonia  Mild bilateral hilar fullness suspected left greater than right suspecting underlying hilar lymphadenopathy  · Noted to have elevated procalcitonin of 0 63  With repeat of 8 38  · Blood cultures pending  · Started on empiric cefepime-will continue at this time  · Will have Infectious Disease see in consultation, appreciate recommendations    Hyponatremia  Assessment & Plan  · Mild likely due to underlying malignancy  · Recheck sodium with improvement  · Most recent sodium 135  · BMP in a m  Anemia  Assessment & Plan  · Likely secondary to lymphoma  · He has no jaundice/elevated LFTs to suggest hemolysis  · He has no sign of bleeding  · Hemoglobin on admission 6 3   · Status post 1 unit of PRBCs on 06/02 with rise in hemoglobin to 7 2   · Hemoglobin remained stable most recent 7 6  · Continue to monitor as patient may need another transfusion  · A m  Lab work    Hodgkin lymphoma [facility])  Assessment & Plan  · Known to hematologist/oncologist at [facility]  · According to his wife and [facility] records he will need immunotherapy , however this has not been started due to insurance issues    · He is high risk for infection due to his lymphoma and immunocompromised state      VTE Pharmacologic Prophylaxis:   Pharmacologic: Heparin  Mechanical VTE Prophylaxis in Place: Yes    Patient Centered Rounds: I have performed bedside rounds with nursing staff today  Discussions with Specialists or Other Care Team Provider: none    Education and Discussions with Family / Patient:  Discussed plan of care with patient and patient's family member at bedside using a   Answered questions  Time Spent for Care: 20 minutes  More than 50% of total time spent on counseling and coordination of care as described above  Current Length of Stay: 2 day(s)    Current Patient Status: Inpatient   Certification Statement: The patient will continue to require additional inpatient hospital stay due to Continue IV antibiotics and further monitoring    Discharge Plan:  Likely within 24-48 hours pending clinical improvement    Code Status: Level 1 - Full Code      Subjective:   Patient states that he is feeling okay today  Denies any chest pain, shortness of breath, nausea, vomiting, diarrhea, constipation  Patient is baseline confused  States that he thinks that he is at [facility]  Is not oriented to time  Unable to provide much history  Objective:     Vitals:   Temp (24hrs), Av 1 °F (37 3 °C), Min:97 9 °F (36 6 °C), Max:100 4 °F (38 °C)    Temp:  [97 9 °F (36 6 °C)-100 4 °F (38 °C)] 100 4 °F (38 °C)  HR:  [103-120] 120  Resp:  [16-18] 18  BP: ()/(50-67) 101/67  SpO2:  [94 %-99 %] 95 %  There is no height or weight on file to calculate BMI  Input and Output Summary (last 24 hours):     No intake or output data in the 24 hours ending 21 1226    Physical Exam:     Physical Exam  Vitals signs and nursing note reviewed  Constitutional:       General: He is not in acute distress  Appearance: He is well-developed  He is ill-appearing  He is not toxic-appearing or diaphoretic  Comments: Frail and cachectic  HENT:      Head: Normocephalic and atraumatic  Eyes:      General: No scleral icterus       Conjunctiva/sclera: Conjunctivae normal    Neck:      Musculoskeletal: Neck supple  Cardiovascular:      Rate and Rhythm: Regular rhythm  Tachycardia present  Heart sounds: No murmur  No friction rub  No gallop  Pulmonary:      Effort: Pulmonary effort is normal  No respiratory distress  Breath sounds: No stridor  Wheezing present  No rhonchi or rales  Comments: With bilateral expiratory wheezing  Chest:      Chest wall: No tenderness  Abdominal:      General: Abdomen is flat  Bowel sounds are normal       Palpations: Abdomen is soft  Tenderness: There is no abdominal tenderness  Musculoskeletal:      Right lower leg: No edema  Left lower leg: No edema  Skin:     General: Skin is warm and dry  Capillary Refill: Capillary refill takes less than 2 seconds  Neurological:      General: No focal deficit present  Mental Status: He is alert  Mental status is at baseline  He is disoriented  Cranial Nerves: No cranial nerve deficit  Additional Data:     Labs:    Results from last 7 days   Lab Units 06/04/21  0501 06/03/21  0633   WBC Thousand/uL 5 41 6 42   HEMOGLOBIN g/dL 7 6* 7 2*   HEMATOCRIT % 24 9* 23 8*   PLATELETS Thousands/uL 431* 414*   NEUTROS PCT %  --  85*   LYMPHS PCT %  --  5*   MONOS PCT %  --  7   EOS PCT %  --  2     Results from last 7 days   Lab Units 06/04/21  0501   SODIUM mmol/L 135*   POTASSIUM mmol/L 3 8   CHLORIDE mmol/L 95*   CO2 mmol/L 30   BUN mg/dL 14   CREATININE mg/dL 0 58*   ANION GAP mmol/L 10   CALCIUM mg/dL 8 7   ALBUMIN g/dL 1 4*   TOTAL BILIRUBIN mg/dL 0 61   ALK PHOS U/L 151*   ALT U/L 34   AST U/L 23   GLUCOSE RANDOM mg/dL 91     Results from last 7 days   Lab Units 06/02/21  1527   INR  1 62*             Results from last 7 days   Lab Units 06/03/21  0633 06/02/21  1724 06/02/21  1527   LACTIC ACID mmol/L  --  1 6 3 4*   PROCALCITONIN ng/ml 8 38*  --  0 63*           * I Have Reviewed All Lab Data Listed Above  * Additional Pertinent Lab Tests Reviewed:  Jeb 66 Admission Reviewed    Imaging:    Imaging Reports Reviewed Today Include:  Chest x-ray  Imaging Personally Reviewed by Myself Includes:  Chest x-ray    Recent Cultures (last 7 days):     Results from last 7 days   Lab Units 06/02/21  1527   BLOOD CULTURE  No Growth at 24 hrs  No Growth at 24 hrs  Last 24 Hours Medication List:   Current Facility-Administered Medications   Medication Dose Route Frequency Provider Last Rate    acetaminophen  650 mg Oral Q6H PRN Jin Patino MD      cefepime  2,000 mg Intravenous Q12H Jin Patino MD 2,000 mg (06/04/21 0501)    heparin (porcine)  5,000 Units Subcutaneous [facility] Jin Patino MD      midodrine  5 mg Oral TID VALENTINA Sorensen DO          Today, Patient Was Seen By: Beryle Cao, PA-C    ** Please Note: Dictation voice to text software may have been used in the creation of this document   **

## 2021-06-04 NOTE — ASSESSMENT & PLAN NOTE
· Likely secondary to lymphoma  · He has no jaundice/elevated LFTs to suggest hemolysis  · He has no sign of bleeding  · Hemoglobin on admission 6 3   · Status post 1 unit of PRBCs on 06/02 with rise in hemoglobin to 7 2   · Hemoglobin remained stable most recent 7 6  · Continue to monitor as patient may need another transfusion  · A m   Lab work

## 2021-06-04 NOTE — CONSULTS
Consultation - Infectious Disease   Rey Ny 77 y o  male MRN: [id number]  Unit/Bed#: E5 -01 Encounter: [id number]      IMPRESSION & RECOMMENDATIONS:     72-year-old male patient with stage IVB Hodgkin lymphoma, multiple metastasis including to spleen, liver, lymph nodes, paraspinal soft tissues, recent admission to [facility] for 2 months of fever, attributed to tumor fever and B symptoms, admitted to our hospital for the same complaints  1- SIRS, POA:  Chest x-ray with hilar fullness, but with no focal infiltrate or consolidation to suggest pneumonia  UA with 2-4 WBC only, COVID-19 PCR negative, LFTs normal except mildly elevated alkaline phosphatase  Workup done at [facility] reviewed, including negative blood culture from 05/24 and 05/25/2021, and a negative respiratory viral panel  Also CT scan from 04/11/2021 available in Care everywhere reviewed and negative for infectious focus by suggesting multiple new sites of lymphomatous disease   - I again suspect that his ongoing fever is related to his widespread lymphoma  He also reports night sweats and weight loss which are compatible with B symptoms  - in setting of elevated and up trending procalcitonin, agree with continuing vancomycin and cefepime for now  - follow-up result of blood culture sent 06/03/2021 which remains negative so far  - please check CT chest abdomen and pelvis  - oncology evaluation    2- stage IVB Hodgkin lymphoma:  Last treated 08/2020 with nivolumab, then, from currently available records in Care everywhere, it appears that patient moved to [facility] and pursued homeopathic treatment, but unfortunately now his last CT scan abdomen pelvis is suggestive of progressive disease, new sites of lymphomatous process, including liver, spleen, soft tissue in the paraspinal area  - as mentioned above, concern for SIRS related to his widespread lymphoma and B symptoms    - patient needs close oncology follow-up  3- wheezing:  Patient is not able to provide accurate history and time of onset of his wheezing and shortness of breath  On exam it appears that he has supra sternal retraction and loud audible wheezing   - check CT chest, rule out presence of lymphadenopathy causing compression on airways  - close monitoring of respiratory status  - consider pulmonary evaluation    4- protein calorie malnutrition:  Likely related to problem 2    - oncology evaluation to determine to reinitiate immunotherapy  - dietary follow-up  - consider palliative care evaluation    Have discussed the above management plan in detail with the primary service  Plan mentioned above discussed with patient and his brother present at the bedside  Extensive review of the medical records in epic including review of the notes, radiographs, and laboratory results     HISTORY OF PRESENT ILLNESS:  Reason for Consult:  SIRS, possible sepsis  HPI: Sharri Cast is a 77y o  year old male with stage IVB Hodgkin lymphoma diagnosed in 2018, now with multiple metastasis including in paraspinal soft tissue, liver, spleen, with multiple lymphadenopathy, now admitted for fever, fatigue  Review of records show that patient was diagnosed with Hodgkin lymphoma in 2018,, he was last treated with nivolumab 08/2020, he then moved to [facility] and pursued homeopathic treatment  [place] he followed up with oncologist at [facility] and was admitted to [facility] 05/24/2021 for fever and weight loss  At that time patient was noted to have 10 lb weight loss over the past month, recurring fever, night sweats  His infectious workup including blood culture, urine culture, COVID-19 PCR, respiratory viral panel were negative  Patient was evaluated by Oncology and by Infectious Disease at [facility]   His fever was attributed to be symptoms from his lymphoma and tumor fever    Patient was discharged without antibiotics, with plan to follow-up with Oncology to start immunotherapy  Patient has not restarted cancer treatment so far  Now patient admitted to our hospital again for fever, T-max 103 1° on admission, with lactic acidosis, normal WBC count  His COVID-19 PCR is again negative, blood culture from 2021 also negative, his LFTs are normal except for mildly elevated alkaline phosphatase  His UA shows only 2-4 WBC  His procalcitonin continue to trend up, now at 29  Upon my evaluation today, patient is AAO x3, able to answer simple questions, reports that he presented to our hospital because of weight loss and poor appetite  He admits of recurring fever  He also reports shortness of breath and wheezing  REVIEW OF SYSTEMS:  A complete review of systems is negative other than that noted in the HPI  PAST MEDICAL HISTORY:  Past Medical History:   Diagnosis Date    Lymphoma [facility])      History reviewed  No pertinent surgical history  FAMILY HISTORY:  Non-contributory    SOCIAL HISTORY:  Social History   Social History     Substance and Sexual Activity   Alcohol Use No     Social History     Substance and Sexual Activity   Drug Use No     Social History     Tobacco Use   Smoking Status Never Smoker   Smokeless Tobacco Never Used       ALLERGIES:  No Known Allergies    MEDICATIONS:  All current active medications have been reviewed        PHYSICAL EXAM:  Temp:  [98 1 °F (36 7 °C)-100 4 °F (38 °C)] 98 1 °F (36 7 °C)  HR:  [103-120] 117  Resp:  [16-21] 21  BP: ()/(61-67) 95/64  SpO2:  [91 %-99 %] 91 %  Temp (24hrs), Av 2 °F (37 3 °C), Min:98 1 °F (36 7 °C), Max:100 4 °F (38 °C)  Current: Temperature: 98 1 °F (36 7 °C)  No intake or output data in the 24 hours ending 21 2523    General Appearance:  AAO x3, cachectic, appears chronically ill, currently with audible wheezing   Head:  Normocephalic, without obvious abnormality, atraumatic   Eyes:  Conjunctiva pink and sclera anicteric, both eyes   Nose: Nares normal, mucosa normal, no drainage   Throat: Oropharynx moist without lesions   Neck: Supple, symmetrical, no adenopathy, no tenderness/mass/nodules   Back:   Symmetric, no curvature, ROM normal, no CVA tenderness   Lungs:   Clear to auscultation bilaterally, tachypneic, suprasternal retraction, audible wheezing   Chest Wall:  No tenderness or deformity  Right chest wall Port-A-Cath, not accessed, no tenderness, no erythema, no erosion  Heart:  RRR; no murmur, rub or gallop   Abdomen:   Soft, non-tender, non-distended, positive bowel sounds    Extremities: No cyanosis, clubbing or edema   Skin: No rashes or lesions  No draining wounds noted  Lymph nodes: Cervical, supraclavicular nodes normal   Neurologic: Alert and oriented times 3, extremity strength 5/5 and symmetric       LABS, IMAGING, & OTHER STUDIES:  Lab Results:  I have personally reviewed pertinent labs  Results from last 7 days   Lab Units 06/04/21  0501 06/03/21 0633 06/02/21  1527   WBC Thousand/uL 5 41 6 42 5 12   HEMOGLOBIN g/dL 7 6* 7 2* 6 3*   PLATELETS Thousands/uL 431* 414* 417*     Results from last 7 days   Lab Units 06/04/21  0501 06/03/21  0633 06/02/21  1527   SODIUM mmol/L 135* 136 135*   POTASSIUM mmol/L 3 8 4 1 4 3   CHLORIDE mmol/L 95* 97* 95*   CO2 mmol/L 30 30 35*   BUN mg/dL 14 10 12   CREATININE mg/dL 0 58* 0 54* 0 57*   EGFR ml/min/1 73sq m 106 109 107   CALCIUM mg/dL 8 7 8 5 8 5   AST U/L 23 32 40   ALT U/L 34 41 49   ALK PHOS U/L 151* 186* 206*     Results from last 7 days   Lab Units 06/02/21  1527   BLOOD CULTURE  No Growth at 24 hrs  No Growth at 24 hrs  Results from last 7 days   Lab Units 06/04/21  0501 06/03/21  0633 06/02/21  1527   PROCALCITONIN ng/ml 29 79* 8 38* 0 63*                   Imaging Studies:   I have personally reviewed pertinent imaging study reports and images in PACS  Chest x-ray with bilateral hilar fullness mainly over the left side    But with no focal opacities      Other Studies:   I have personally reviewed pertinent reports  CT scan abdomen pelvis on 04/11/2021 showed hilar and mediastinal lymphadenopathy, upper abdominal lymphadenopathy, hypodense lesions throughout the liver and spleen reflecting possible new sites of disease, paraspinal soft tissue masses also suggesting new sites of disease  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

## 2021-06-04 NOTE — ASSESSMENT & PLAN NOTE
· Likely SIRS from B symptoms due to lymphoma  · However, noted to have elevated lactic acid together with fever and tachycardia on admission  · Patient remains a tachycardia of 120, febrile 100 4 and elevated lactic acid  · Urine culture without acute infection  · Chest x-ray no focal opacity to suggest pneumonia  Mild bilateral hilar fullness suspected left greater than right suspecting underlying hilar lymphadenopathy  · Noted to have elevated procalcitonin of 0 63    With repeat of 8 38  · Blood cultures negative at 24 hrs  · Started on empiric cefepime-will continue at this time given elevated procal  · Will have Infectious Disease see in consultation, appreciate recommendations  · Infectious disease suspect this is likely secondary to stage IV Hodgkin lymphoma with metastasis, not infectious   · Patient admitted to [facility] approximately a week and half ago for similar symptoms  · Will obtain repeat CT chest abdomen pelvis with contrast for further evaluation of disease extent  · Consult heme/Onc

## 2021-06-04 NOTE — QUICK NOTE
Extensive conversation was had with patient and patient's wife at bedside using [place]   Explained the severity of the patient's condition to the patient's wife at length  Discussed the patient's metastatic cancer, new found pulmonary embolism, and hypotension  Explained that we need to treat the blood clot and the hypotension before he is able to obtain immunotherapy as he needs to be stable in order to receive that treatment  The patient's wife was insistent on seeing there oncologist who was at [facility], I explained that unfortunately the her their oncologist is unable to see the patient while admitted to [facility] because he is in a different hospital network  She states that she understands but she wants to see him  Again I explained that he needs to be stable in order for us to discharge him  Discussed code status with patient's wife and patient, they are still in agreement that the patient should remain full code  Reviewed oncology notes from [facility], appears that similar conversations were had in the past with patient and he does not believe that he has cancer and believes that God will heal him  Is resistant to chemotherapy, patient and patient's wife would like to pursue immunotherapy    Explained that in order to obtain immunotherapy, we have to stabilize the patient first

## 2021-06-05 PROBLEM — I95.9 HYPOTENSION: Status: ACTIVE | Noted: 2021-06-05

## 2021-06-05 PROBLEM — E87.6 HYPOKALEMIA: Status: ACTIVE | Noted: 2021-06-05

## 2021-06-05 PROBLEM — I26.93 SINGLE SUBSEGMENTAL PULMONARY EMBOLISM WITHOUT ACUTE COR PULMONALE (HCC): Status: ACTIVE | Noted: 2021-06-05

## 2021-06-05 LAB
ANION GAP SERPL CALCULATED.3IONS-SCNC: 10 MMOL/L (ref 4–13)
APTT PPP: 55 SECONDS (ref 23–37)
BUN SERPL-MCNC: 14 MG/DL (ref 5–25)
CALCIUM SERPL-MCNC: 8.6 MG/DL (ref 8.3–10.1)
CHLORIDE SERPL-SCNC: 92 MMOL/L (ref 100–108)
CO2 SERPL-SCNC: 30 MMOL/L (ref 21–32)
CREAT SERPL-MCNC: 0.56 MG/DL (ref 0.6–1.3)
ERYTHROCYTE [DISTWIDTH] IN BLOOD BY AUTOMATED COUNT: 22.3 % (ref 11.6–15.1)
GFR SERPL CREATININE-BSD FRML MDRD: 108 ML/MIN/1.73SQ M
GLUCOSE SERPL-MCNC: 123 MG/DL (ref 65–140)
HCT VFR BLD AUTO: 26.4 % (ref 36.5–49.3)
HGB BLD-MCNC: 7.8 G/DL (ref 12–17)
MCH RBC QN AUTO: 21.3 PG (ref 26.8–34.3)
MCHC RBC AUTO-ENTMCNC: 29.5 G/DL (ref 31.4–37.4)
MCV RBC AUTO: 72 FL (ref 82–98)
PLATELET # BLD AUTO: 372 THOUSANDS/UL (ref 149–390)
PMV BLD AUTO: 10.1 FL (ref 8.9–12.7)
POTASSIUM SERPL-SCNC: 3.2 MMOL/L (ref 3.5–5.3)
PROCALCITONIN SERPL-MCNC: 25.75 NG/ML
RBC # BLD AUTO: 3.67 MILLION/UL (ref 3.88–5.62)
SODIUM SERPL-SCNC: 132 MMOL/L (ref 136–145)
WBC # BLD AUTO: 5.22 THOUSAND/UL (ref 4.31–10.16)

## 2021-06-05 PROCEDURE — 99232 SBSQ HOSP IP/OBS MODERATE 35: CPT | Performed by: INTERNAL MEDICINE

## 2021-06-05 PROCEDURE — 99222 1ST HOSP IP/OBS MODERATE 55: CPT | Performed by: INTERNAL MEDICINE

## 2021-06-05 PROCEDURE — 85730 THROMBOPLASTIN TIME PARTIAL: CPT | Performed by: HOSPITALIST

## 2021-06-05 PROCEDURE — 84145 PROCALCITONIN (PCT): CPT | Performed by: PHYSICIAN ASSISTANT

## 2021-06-05 PROCEDURE — 99233 SBSQ HOSP IP/OBS HIGH 50: CPT | Performed by: PHYSICIAN ASSISTANT

## 2021-06-05 PROCEDURE — 85027 COMPLETE CBC AUTOMATED: CPT | Performed by: PHYSICIAN ASSISTANT

## 2021-06-05 PROCEDURE — 80048 BASIC METABOLIC PNL TOTAL CA: CPT | Performed by: PHYSICIAN ASSISTANT

## 2021-06-05 RX ORDER — MIDODRINE HYDROCHLORIDE 5 MG/1
15 TABLET ORAL
Status: DISCONTINUED | OUTPATIENT
Start: 2021-06-05 | End: 2021-06-10 | Stop reason: HOSPADM

## 2021-06-05 RX ORDER — SODIUM CHLORIDE, SODIUM LACTATE, POTASSIUM CHLORIDE, CALCIUM CHLORIDE 600; 310; 30; 20 MG/100ML; MG/100ML; MG/100ML; MG/100ML
50 INJECTION, SOLUTION INTRAVENOUS CONTINUOUS
Status: DISCONTINUED | OUTPATIENT
Start: 2021-06-05 | End: 2021-06-05

## 2021-06-05 RX ORDER — ALBUMIN, HUMAN INJ 5% 5 %
25 SOLUTION INTRAVENOUS ONCE
Status: COMPLETED | OUTPATIENT
Start: 2021-06-05 | End: 2021-06-06

## 2021-06-05 RX ORDER — MIDODRINE HYDROCHLORIDE 5 MG/1
5 TABLET ORAL ONCE
Status: COMPLETED | OUTPATIENT
Start: 2021-06-05 | End: 2021-06-05

## 2021-06-05 RX ORDER — POTASSIUM CHLORIDE 20 MEQ/1
40 TABLET, EXTENDED RELEASE ORAL ONCE
Status: COMPLETED | OUTPATIENT
Start: 2021-06-05 | End: 2021-06-05

## 2021-06-05 RX ADMIN — MIDODRINE HYDROCHLORIDE 15 MG: 5 TABLET ORAL at 11:52

## 2021-06-05 RX ADMIN — SODIUM CHLORIDE, SODIUM LACTATE, POTASSIUM CHLORIDE, AND CALCIUM CHLORIDE 50 ML/HR: .6; .31; .03; .02 INJECTION, SOLUTION INTRAVENOUS at 11:52

## 2021-06-05 RX ADMIN — SODIUM CHLORIDE 500 ML: 0.9 INJECTION, SOLUTION INTRAVENOUS at 05:25

## 2021-06-05 RX ADMIN — VANCOMYCIN HYDROCHLORIDE 1000 MG: 1 INJECTION, SOLUTION INTRAVENOUS at 07:18

## 2021-06-05 RX ADMIN — ACETAMINOPHEN 650 MG: 325 TABLET, FILM COATED ORAL at 01:56

## 2021-06-05 RX ADMIN — HEPARIN SODIUM 2200 UNITS: 1000 INJECTION INTRAVENOUS; SUBCUTANEOUS at 07:11

## 2021-06-05 RX ADMIN — POTASSIUM CHLORIDE 40 MEQ: 1500 TABLET, EXTENDED RELEASE ORAL at 11:52

## 2021-06-05 RX ADMIN — MIDODRINE HYDROCHLORIDE 10 MG: 5 TABLET ORAL at 06:24

## 2021-06-05 RX ADMIN — MIDODRINE HYDROCHLORIDE 15 MG: 5 TABLET ORAL at 16:38

## 2021-06-05 RX ADMIN — MIDODRINE HYDROCHLORIDE 5 MG: 5 TABLET ORAL at 07:55

## 2021-06-05 NOTE — PLAN OF CARE
Problem: Potential for Falls  Goal: Patient will remain free of falls  Description: INTERVENTIONS:  - Assess patient frequently for physical needs  -  Identify cognitive and physical deficits and behaviors that affect risk of falls    -  [place] fall precautions as indicated by assessment   - Educate patient/family on patient safety including physical limitations  - Instruct patient to call for assistance with activity based on assessment  - Modify environment to reduce risk of injury  - Consider OT/PT consult to assist with strengthening/mobility  Outcome: Progressing     Problem: PAIN - ADULT  Goal: Verbalizes/displays adequate comfort level or baseline comfort level  Description: Interventions:  - Encourage patient to monitor pain and request assistance  - Assess pain using appropriate pain scale  - Administer analgesics based on type and severity of pain and evaluate response  - Implement non-pharmacological measures as appropriate and evaluate response  - Consider cultural and social influences on pain and pain management  - Notify physician/advanced practitioner if interventions unsuccessful or patient reports new pain  Outcome: Progressing     Problem: INFECTION - ADULT  Goal: Absence or prevention of progression during hospitalization  Description: INTERVENTIONS:  - Assess and monitor for signs and symptoms of infection  - Monitor lab/diagnostic results  - Monitor all insertion sites, i e  indwelling lines, tubes, and drains  - Monitor endotracheal if appropriate and nasal secretions for changes in amount and color  - [place] appropriate cooling/warming therapies per order  - Administer medications as ordered  - Instruct and encourage patient and family to use good hand hygiene technique  - Identify and instruct in appropriate isolation precautions for identified infection/condition  Outcome: Progressing  Goal: Absence of fever/infection during neutropenic period  Description: INTERVENTIONS:  - Monitor WBC    Outcome: Progressing     Problem: SAFETY ADULT  Goal: Patient will remain free of falls  Description: INTERVENTIONS:  - Assess patient frequently for physical needs  -  Identify cognitive and physical deficits and behaviors that affect risk of falls    -  [place] fall precautions as indicated by assessment   - Educate patient/family on patient safety including physical limitations  - Instruct patient to call for assistance with activity based on assessment  - Modify environment to reduce risk of injury  - Consider OT/PT consult to assist with strengthening/mobility  Outcome: Progressing  Goal: Maintain or return to baseline ADL function  Description: INTERVENTIONS:  -  Assess patient's ability to carry out ADLs; assess patient's baseline for ADL function and identify physical deficits which impact ability to perform ADLs (bathing, care of mouth/teeth, toileting, grooming, dressing, etc )  - Assess/evaluate cause of self-care deficits   - Assess range of motion  - Assess patient's mobility; develop plan if impaired  - Assess patient's need for assistive devices and provide as appropriate  - Encourage maximum independence but intervene and supervise when necessary  - Involve family in performance of ADLs  - Assess for home care needs following discharge   - Consider OT consult to assist with ADL evaluation and planning for discharge  - Provide patient education as appropriate  Outcome: Progressing  Goal: Maintain or return mobility status to optimal level  Description: INTERVENTIONS:  - Assess patient's baseline mobility status (ambulation, transfers, stairs, etc )    - Identify cognitive and physical deficits and behaviors that affect mobility  - Identify mobility aids required to assist with transfers and/or ambulation (gait belt, sit-to-stand, lift, walker, cane, etc )  - [place] fall precautions as indicated by assessment  - Record patient progress and toleration of activity level on Mobility SBAR; progress patient to next Phase/Stage  - Instruct patient to call for assistance with activity based on assessment  - Consider rehabilitation consult to assist with strengthening/weightbearing, etc   Outcome: Progressing     Problem: DISCHARGE PLANNING  Goal: Discharge to home or other facility with appropriate resources  Description: INTERVENTIONS:  - Identify barriers to discharge w/patient and caregiver  - Arrange for needed discharge resources and transportation as appropriate  - Identify discharge learning needs (meds, wound care, etc )  - Arrange for interpretive services to assist at discharge as needed  - Refer to Case Management Department for coordinating discharge planning if the patient needs post-hospital services based on physician/advanced practitioner order or complex needs related to functional status, cognitive ability, or social support system  Outcome: Progressing     Problem: Knowledge Deficit  Goal: Patient/family/caregiver demonstrates understanding of disease process, treatment plan, medications, and discharge instructions  Description: Complete learning assessment and assess knowledge base    Interventions:  - Provide teaching at level of understanding  - Provide teaching via preferred learning methods  Outcome: Progressing     Problem: Prexisting or High Potential for Compromised Skin Integrity  Goal: Skin integrity is maintained or improved  Description: INTERVENTIONS:  - Identify patients at risk for skin breakdown  - Assess and monitor skin integrity  - Assess and monitor nutrition and hydration status  - Monitor labs   - Assess for incontinence   - Turn and reposition patient  - Assist with mobility/ambulation  - Relieve pressure over bony prominences  - Avoid friction and shearing  - Provide appropriate hygiene as needed including keeping skin clean and dry  - Evaluate need for skin moisturizer/barrier cream  - Collaborate with interdisciplinary team   - Patient/family teaching  - Consider wound care consult   Outcome: Progressing     Problem: Nutrition/Hydration-ADULT  Goal: Nutrient/Hydration intake appropriate for improving, restoring or maintaining nutritional needs  Description: Monitor and assess patient's nutrition/hydration status for malnutrition  Collaborate with interdisciplinary team and initiate plan and interventions as ordered  Monitor patient's weight and dietary intake as ordered or per policy  Utilize nutrition screening tool and intervene as necessary  Determine patient's food preferences and provide high-protein, high-caloric foods as appropriate       INTERVENTIONS:  - Monitor oral intake, urinary output, labs, and treatment plans  - Assess nutrition and hydration status and recommend course of action  - Evaluate amount of meals eaten  - Assist patient with eating if necessary   - Allow adequate time for meals  - Recommend/ encourage appropriate diets, oral nutritional supplements, and vitamin/mineral supplements  - Order, calculate, and assess calorie counts as needed  - Recommend, monitor, and adjust tube feedings and TPN/PPN based on assessed needs  - Assess need for intravenous fluids  - Provide specific nutrition/hydration education as appropriate  - Include patient/family/caregiver in decisions related to nutrition  Outcome: Progressing

## 2021-06-05 NOTE — NURSING NOTE
Patient is refusing lab draw  Patient is currently on a heparin gtt  Explained the importance of obtaining a PTT but Patient and family member refused stating "It is making him sick"

## 2021-06-05 NOTE — CONSULTS
Consultation - Medical Oncology   El Pickett 77 y o  male MRN: [id number]  Unit/Bed#: E5 -01 Encounter: [id number]    Referring physician:  [facility]  Reason for Consult:  Stage IV symptomatic Hodgkin's lymphoma with disease progression   HPI: El Pickett is a 77y o  year old male   Wife in the room  Used  by using iPad  Patient has stage IV symptomatic Hodgkin's lymphoma that was diagnosed in August 2018  Initially patient declined standard chemotherapy  Disease progressed and he was started on ABVD chemotherapy on 10/31/2018  He had 2 cycles of ABVD with good response and remaining 4 cycles of AVD without bleomycin  He was re-treated in June through September 2019 with brentuximab for recurrent lymphoma in the lungs and again he had near complete response on the PET scan done in September 2019  Disease progressed in June 2020 and he had 4 treatments with nivolumab with symptomatic response and after [place]  When he came back he had symptomatic progression of disease including disease in the liver that was biopsy-proven in April 2021  He was going to be restarted in on nivolumab in May 2021 but has not received that yet  Patient does not want chemotherapy  He wants immunotherapy and he wants to be treated by his doctor at [facility] Dr Charlie Abbott post discharge from [address]  Now he has symptomatic extensive multiple site Hodgkin's lymphoma in the chest abdomen and pelvis involving lungs, lymph nodes, liver and spleen  Also he has subsegmental right lower lobe pulmonary embolism and is being treated for that  Patient had fevers but now he has sweats  He states he is feeling better post transfusion  Laura Skipper He is not sure if he has lost weight  He ambulates slowly but independently  No pain      ROS:  06/05/21 Reviewed  systems: See symptoms in HPI  Presently no other neurological, cardiac, pulmonary, GI and  symptoms other than listed in HPI  Other symptoms are in HPI  No   bleeding      Historical Information   Past Medical History:   Diagnosis Date    Lymphoma [facility])      History reviewed  No pertinent surgical history  Social History   Social History     Substance and Sexual Activity   Alcohol Use No     Social History     Substance and Sexual Activity   Drug Use No     Social History     Tobacco Use   Smoking Status Never Smoker   Smokeless Tobacco Never Used     Family History: History reviewed  No pertinent family history        Current Facility-Administered Medications:     acetaminophen (TYLENOL) tablet 650 mg, 650 mg, Oral, Q6H PRN, Harsh Winter MD, 650 mg at 06/05/21 0156    cefepime (MAXIPIME) 2,000 mg in dextrose 5 % 50 mL IVPB, 2,000 mg, Intravenous, Q12H, Harsh Winter MD, Last Rate: 100 mL/hr at 06/05/21 0630, Restarted at 06/05/21 0630    heparin (porcine) 25,000 units in 0 45% NaCl 250 mL infusion (premix), 3-30 Units/kg/hr (Order-Specific), Intravenous, Titrated, Isabella Matson PA-C, Last Rate: 11 mL/hr at 06/05/21 0711, 20 Units/kg/hr at 06/05/21 0711    heparin (porcine) injection 2,200 Units, 2,200 Units, Intravenous, Q1H PRN, Isabella Matson PA-C, 2,200 Units at 06/05/21 0711    heparin (porcine) injection 4,400 Units, 4,400 Units, Intravenous, Q1H PRN, Isabella Matson PA-C    midodrine (PROAMATINE) tablet 15 mg, 15 mg, Oral, TID AC, Isabella Matson PA-C    vancomycin (VANCOCIN) IVPB (premix in dextrose) 1,000 mg 200 mL, 1,000 mg, Intravenous, Q12H, Derek Smith MD, Last Rate: 200 mL/hr at 06/05/21 0718, 1,000 mg at 06/05/21 0718    No Known Allergies  @ ROS@  Physical Exam:  Vitals:    06/05/21 0320 06/05/21 0322 06/05/21 0721 06/05/21 0756   BP:  (!) 83/54 (!) 83/53    Pulse:  104 (!) 110    Resp:   18    Temp: (!) 102 4 °F (39 1 °C) 98 3 °F (36 8 °C) 98 3 °F (36 8 °C)    TempSrc:       SpO2:  (!) 88% 95%    Weight:    55 5 kg (122 lb 5 7 oz) Height:    6' (1 829 m)     Alert, oriented, not in distress, no icterus, no oral thrush, no palpable neck mass, clear lung fields, regular heart rate, tachycardia, abdomen  soft , no palpable abdominal mass, no ascites, no edema of ankles, no skin rash, no palpable lymphadenopathy,  no clubbing  Lab Results: I have reviewed all pertinent labs  LABS:  Results for orders placed or performed during the hospital encounter of 06/02/21   Blood culture #1    Specimen: Arm, Right; Blood   Result Value Ref Range    Blood Culture No Growth at 48 hrs  Blood culture #2    Specimen: Arm, Left; Blood   Result Value Ref Range    Blood Culture No Growth at 48 hrs      Novel Coronavirus (Covid-19),PCR [facility] - 2 Hour Stat    Specimen: Nasopharyngeal Swab; Nares   Result Value Ref Range    SARS-CoV-2 Negative Negative   APTT   Result Value Ref Range    PTT 36 23 - 37 seconds   Protime-INR   Result Value Ref Range    Protime 18 9 (H) 11 6 - 14 5 seconds    INR 1 62 (H) 0 84 - 1 19   CBC and differential   Result Value Ref Range    WBC 5 12 4 31 - 10 16 Thousand/uL    RBC 3 00 (L) 3 88 - 5 62 Million/uL    Hemoglobin 6 3 (LL) 12 0 - 17 0 g/dL    Hematocrit 21 4 (L) 36 5 - 49 3 %    MCV 71 (L) 82 - 98 fL    MCH 21 0 (L) 26 8 - 34 3 pg    MCHC 29 4 (L) 31 4 - 37 4 g/dL    RDW 20 2 (H) 11 6 - 15 1 %    MPV 9 3 8 9 - 12 7 fL    Platelets 638 (H) 994 - 390 Thousands/uL    nRBC 0 /100 WBCs    Neutrophils Relative 82 (H) 43 - 75 %    Immat GRANS % 1 0 - 2 %    Lymphocytes Relative 6 (L) 14 - 44 %    Monocytes Relative 7 4 - 12 %    Eosinophils Relative 4 0 - 6 %    Basophils Relative 0 0 - 1 %    Neutrophils Absolute 4 24 1 85 - 7 62 Thousands/µL    Immature Grans Absolute 0 06 0 00 - 0 20 Thousand/uL    Lymphocytes Absolute 0 28 (L) 0 60 - 4 47 Thousands/µL    Monocytes Absolute 0 34 0 17 - 1 22 Thousand/µL    Eosinophils Absolute 0 20 0 00 - 0 61 Thousand/µL    Basophils Absolute 0 00 0 00 - 0 10 Thousands/µL   Comprehensive metabolic panel   Result Value Ref Range    Sodium 135 (L) 136 - 145 mmol/L    Potassium 4 3 3 5 - 5 3 mmol/L    Chloride 95 (L) 100 - 108 mmol/L    CO2 35 (H) 21 - 32 mmol/L    ANION GAP 5 4 - 13 mmol/L    BUN 12 5 - 25 mg/dL    Creatinine 0 57 (L) 0 60 - 1 30 mg/dL    Glucose 99 65 - 140 mg/dL    Calcium 8 5 8 3 - 10 1 mg/dL    Corrected Calcium 10 4 (H) 8 3 - 10 1 mg/dL    AST 40 5 - 45 U/L    ALT 49 12 - 78 U/L    Alkaline Phosphatase 206 (H) 46 - 116 U/L    Total Protein 6 3 (L) 6 4 - 8 2 g/dL    Albumin 1 6 (L) 3 5 - 5 0 g/dL    Total Bilirubin 0 32 0 20 - 1 00 mg/dL    eGFR 107 ml/min/1 73sq m   Lactic acid   Result Value Ref Range    LACTIC ACID 3 4 (HH) 0 5 - 2 0 mmol/L   Procalcitonin with AM Reflex   Result Value Ref Range    Procalcitonin 0 63 (H) <=0 25 ng/ml   UA w Reflex to Microscopic w Reflex to Culture    Specimen: Urine, Clean Catch   Result Value Ref Range    Color, UA Cheryl     Clarity, UA Clear     Specific Gravity, UA 1 020 1 003 - 1 030    pH, UA 7 5 4 5, 5 0, 5 5, 6 0, 6 5, 7 0, 7 5, 8 0    Leukocytes, UA Negative Negative    Nitrite, UA Negative Negative    Protein,  (2+) (A) Negative mg/dl    Glucose,  (1/4%) (A) Negative mg/dl    Ketones, UA Negative Negative mg/dl    Urobilinogen, UA 1 0 0 2, 1 0 E U /dl E U /dl    Bilirubin, UA Negative Negative    Blood, UA Negative Negative   Urine Microscopic   Result Value Ref Range    RBC, UA None Seen None Seen, 2-4 /hpf    WBC, UA 2-4 None Seen, 2-4 /hpf    Epithelial Cells Occasional None Seen, Occasional /hpf    Bacteria, UA Moderate (A) None Seen, Occasional /hpf    Hyaline Casts, UA 1-2 (A) (none) /lpf    Ca Oxalate Kayla, UA Moderate (A) None Seen /hpf    MUCUS THREADS Innumerable (A) None Seen   Lactic acid 2 Hours   Result Value Ref Range    LACTIC ACID 1 6 0 5 - 2 0 mmol/L   Procalcitonin Reflex   Result Value Ref Range    Procalcitonin 8 38 (H) <=0 25 ng/ml   Comprehensive metabolic panel   Result Value Ref Range    Sodium 136 136 - 145 mmol/L    Potassium 4 1 3 5 - 5 3 mmol/L    Chloride 97 (L) 100 - 108 mmol/L    CO2 30 21 - 32 mmol/L    ANION GAP 9 4 - 13 mmol/L    BUN 10 5 - 25 mg/dL    Creatinine 0 54 (L) 0 60 - 1 30 mg/dL    Glucose 97 65 - 140 mg/dL    Calcium 8 5 8 3 - 10 1 mg/dL    Corrected Calcium 10 5 (H) 8 3 - 10 1 mg/dL    AST 32 5 - 45 U/L    ALT 41 12 - 78 U/L    Alkaline Phosphatase 186 (H) 46 - 116 U/L    Total Protein 6 1 (L) 6 4 - 8 2 g/dL    Albumin 1 5 (L) 3 5 - 5 0 g/dL    Total Bilirubin 0 55 0 20 - 1 00 mg/dL    eGFR 109 ml/min/1 73sq m   CBC and differential   Result Value Ref Range    WBC 6 42 4 31 - 10 16 Thousand/uL    RBC 3 34 (L) 3 88 - 5 62 Million/uL    Hemoglobin 7 2 (L) 12 0 - 17 0 g/dL    Hematocrit 23 8 (L) 36 5 - 49 3 %    MCV 71 (L) 82 - 98 fL    MCH 21 6 (L) 26 8 - 34 3 pg    MCHC 30 3 (L) 31 4 - 37 4 g/dL    RDW 21 4 (H) 11 6 - 15 1 %    MPV 9 0 8 9 - 12 7 fL    Platelets 584 (H) 836 - 390 Thousands/uL    nRBC 0 /100 WBCs    Neutrophils Relative 85 (H) 43 - 75 %    Immat GRANS % 1 0 - 2 %    Lymphocytes Relative 5 (L) 14 - 44 %    Monocytes Relative 7 4 - 12 %    Eosinophils Relative 2 0 - 6 %    Basophils Relative 0 0 - 1 %    Neutrophils Absolute 5 45 1 85 - 7 62 Thousands/µL    Immature Grans Absolute 0 08 0 00 - 0 20 Thousand/uL    Lymphocytes Absolute 0 34 (L) 0 60 - 4 47 Thousands/µL    Monocytes Absolute 0 42 0 17 - 1 22 Thousand/µL    Eosinophils Absolute 0 12 0 00 - 0 61 Thousand/µL    Basophils Absolute 0 01 0 00 - 0 10 Thousands/µL   Comprehensive metabolic panel   Result Value Ref Range    Sodium 135 (L) 136 - 145 mmol/L    Potassium 3 8 3 5 - 5 3 mmol/L    Chloride 95 (L) 100 - 108 mmol/L    CO2 30 21 - 32 mmol/L    ANION GAP 10 4 - 13 mmol/L    BUN 14 5 - 25 mg/dL    Creatinine 0 58 (L) 0 60 - 1 30 mg/dL    Glucose 91 65 - 140 mg/dL    Calcium 8 7 8 3 - 10 1 mg/dL    Corrected Calcium 10 8 (H) 8 3 - 10 1 mg/dL    AST 23 5 - 45 U/L    ALT 34 12 - 78 U/L    Alkaline Phosphatase 151 (H) 46 - 116 U/L    Total Protein 6 1 (L) 6 4 - 8 2 g/dL    Albumin 1 4 (L) 3 5 - 5 0 g/dL    Total Bilirubin 0 61 0 20 - 1 00 mg/dL    eGFR 106 ml/min/1 73sq m   CBC   Result Value Ref Range    WBC 5 41 4 31 - 10 16 Thousand/uL    RBC 3 45 (L) 3 88 - 5 62 Million/uL    Hemoglobin 7 6 (L) 12 0 - 17 0 g/dL    Hematocrit 24 9 (L) 36 5 - 49 3 %    MCV 72 (L) 82 - 98 fL    MCH 22 0 (L) 26 8 - 34 3 pg    MCHC 30 5 (L) 31 4 - 37 4 g/dL    RDW 21 3 (H) 11 6 - 15 1 %    Platelets 868 (H) 885 - 390 Thousands/uL    MPV 9 3 8 9 - 12 7 fL   Procalcitonin   Result Value Ref Range    Procalcitonin 29 79 (H) <=0 25 ng/ml   Magnesium   Result Value Ref Range    Magnesium 1 8 1 6 - 2 6 mg/dL   APTT   Result Value Ref Range     (HH) 23 - 37 seconds   Protime-INR   Result Value Ref Range    Protime 20 5 (H) 11 6 - 14 5 seconds    INR 1 79 (H) 0 84 - 1 19   APTT   Result Value Ref Range    PTT 76 (H) 23 - 37 seconds   APTT   Result Value Ref Range    PTT 55 (H) 23 - 37 seconds   CBC   Result Value Ref Range    WBC 5 22 4 31 - 10 16 Thousand/uL    RBC 3 67 (L) 3 88 - 5 62 Million/uL    Hemoglobin 7 8 (L) 12 0 - 17 0 g/dL    Hematocrit 26 4 (L) 36 5 - 49 3 %    MCV 72 (L) 82 - 98 fL    MCH 21 3 (L) 26 8 - 34 3 pg    MCHC 29 5 (L) 31 4 - 37 4 g/dL    RDW 22 3 (H) 11 6 - 15 1 %    Platelets 526 219 - 566 Thousands/uL    MPV 10 1 8 9 - 12 7 fL   Basic metabolic panel   Result Value Ref Range    Sodium 132 (L) 136 - 145 mmol/L    Potassium 3 2 (L) 3 5 - 5 3 mmol/L    Chloride 92 (L) 100 - 108 mmol/L    CO2 30 21 - 32 mmol/L    ANION GAP 10 4 - 13 mmol/L    BUN 14 5 - 25 mg/dL    Creatinine 0 56 (L) 0 60 - 1 30 mg/dL    Glucose 123 65 - 140 mg/dL    Calcium 8 6 8 3 - 10 1 mg/dL    eGFR 108 ml/min/1 73sq m   ECG 12 lead   Result Value Ref Range    Ventricular Rate 113 BPM    Atrial Rate 126 BPM    NE Interval 152 ms    QRSD Interval 78 ms    QT Interval 328 ms    QTC Interval 449 ms    P Axis 80 degrees    QRS Axis 66 degrees    T Wave Axis 73 degrees   Type and screen   Result Value Ref Range    ABO Grouping O     Rh Factor Positive     Antibody Screen Negative     Specimen Expiration Date [id number]    Prepare Leukoreduced RBC: 1 Units   Result Value Ref Range    Unit Product Code [id number]     Unit Number [id number]     Unit ABO O     Unit RH POS     Crossmatch Compatible     Unit Dispense Status Presumed Trans    ABORh Recheck - Contact Blood Bank Prior to Collection   Result Value Ref Range    ABO Grouping O     Rh Factor Positive          Imaging Studies: I have personally reviewed pertinent reports  IMPRESSION:     Evidence of multifocal malignancy in the chest, abdomen and pelvis involving lung parenchyma, lymph nodes, liver and spleen      Subsegmental right lower lobe PE      Small pleural effusions      Other nonemergent findings above         Workstation performed: [id number]      Imaging    CT chest abdomen pelvis w contrast (Order: [id number]) - 6/4/2021    Pathology, and Other Studies: I have personally reviewed pertinent reports  Assessment and Plan:  See diagnoses, orders instructions below  Symptomatic stage IVB relapsed Hodgkin's lymphoma with involvement of lungs, lymph nodes, liver and spleen and patient needs therapy and he wants immune therapy and not chemotherapy and he wants to be treated by his doctor at [address] discharge please refer patient to Dr gAustin Hernandez at [facility] within a week for treatment of  Hodgkin's disease  His present symptoms are secondary to Hodgkin's disease  Anemia and mild thrombocytosis probably secondary to Hodgkin's disease  For transfusions please use leuko reduced and irradiated blood if possible  Subsegmental right lower lobe pulmonary embolism and he is being treated for that

## 2021-06-05 NOTE — NURSING NOTE
This RN attempted the lab draw  Pt and family further refused  Family stated "He has a trauma  He feels sicker with the blood draws " This RN further explained the necessity of the lab draws  Physician notified

## 2021-06-05 NOTE — ASSESSMENT & PLAN NOTE
· Likely secondary to lymphoma  · He has no jaundice/elevated LFTs to suggest hemolysis  · He has no sign of bleeding  · Hemoglobin on admission 6 3   · Status post 1 unit of PRBCs on 06/02 with rise in hemoglobin to 7 2   · Hemoglobin remained stable most recent 7 8  · Continue to monitor as patient may need another transfusion  · A m   Lab work

## 2021-06-05 NOTE — PROGRESS NOTES
Vancomycin IV Pharmacy-to-Dose Consultation    Hilary Oh is a 77 y o  male who is currently receiving Vancomycin IV with management by the Pharmacy Consult service  Assessment/Plan:  The patient was reviewed  Renal function is stable and no signs or symptoms of nephrotoxicity and/or infusion reactions were documented in the chart  Based on todays assessment, continue current vancomycin (day # 2) dosing of 1000mg IV every 12 hours, with a plan for trough to be drawn at 0545 on 6/6/21    We will continue to follow the patients culture results and clinical progress daily      Albino Marrero, Pharmacist

## 2021-06-05 NOTE — PROGRESS NOTES
Progress Note - Infectious Disease   Chaitanya Abdul 77 y o  male MRN: [id number]  Unit/Bed#: E5 -01 Encounter: [id number]      Impression/Recommendations:  1- SIRS, POA:  Chest x-ray with hilar fullness, but with no focal infiltrate or consolidation to suggest pneumonia  UA with 2-4 WBC only, COVID-19 PCR negative, LFTs normal except mildly elevated alkaline phosphatase  Workup done at [facility] reviewed, including negative blood culture from 05/24 and 05/25/2021, and a negative respiratory viral panel  Also CT scan from 04/11/2021 available in Care everywhere reviewed and negative for infectious focus by suggesting multiple new sites of lymphomatous disease  Repeat chest/abdomen/pelvis CT showed lymphoma but no other active lesions  Fever/chills/night sweats most likely secondary to lymphoma  All blood cultures have no growth  -  discontinue antibiotics and observe  - follow-up result of blood culture sent 06/03/2021 which remains negative so far     2- stage IVB Hodgkin lymphoma:  Last treated 08/2020 with nivolumab, then, from currently available records in Care everywhere, it appears that patient moved to \[facility]\"" and pursued homeopathic treatment, but unfortunately now his last CT scan abdomen pelvis is suggestive of progressive disease, new sites of lymphomatous process, including liver, spleen, soft tissue in the paraspinal area  - as mentioned above, SIRS is most likely related to his widespread lymphoma and B symptoms  - patient needs close oncology follow-up       3-  small PE  Patient minimally symptomatic   -  anticoagulation per primary service    4  wheezing:  Patient is clinically improved  CT without obstruction  - check CT chest, rule out presence of lymphadenopathy causing compression on airways    - close monitoring of respiratory status  - consider pulmonary evaluation     5- protein calorie malnutrition:  Likely related to problem 2    - oncology evaluation to determine to reinitiate immunotherapy  - dietary follow-up  - consider palliative care evaluation     Discussed with patient and wife in detail regarding above plan, [place]   [place] for discharge from ID viewpoint  Follow-up with Oncology after discharge     Antibiotics:  Vanco/cefepime    Subjective:  Patient has ongoing intermittent fever  Stable chills and night sweats  No specific complaints  No diarrhea  Objective:  Vitals:  Temp:  [98 1 °F (36 7 °C)-102 4 °F (39 1 °C)] 98 3 °F (36 8 °C)  HR:  [104-118] 110  Resp:  [18-22] 18  BP: (83-95)/(53-65) 83/53  SpO2:  [88 %-95 %] 95 %  Temp (24hrs), Av 1 °F (37 3 °C), Min:98 1 °F (36 7 °C), Max:102 4 °F (39 1 °C)  Current: Temperature: 98 3 °F (36 8 °C)    Physical Exam:     General: Awake, alert, cooperative, no distress  Cachectic and chronically ill  Neck:  Supple  No mass  No lymphadenopathy  Lungs: Expansion symmetric, no rales, no wheezing, respirations unlabored  Heart:  Tachycardic with regular rhythm, S1 and S2 normal, no murmur  Abdomen: Soft, nondistended, non-tender, bowel sounds active all four quadrants, no masses, no organomegaly  Extremities: No edema  No erythema/warmth  No ulcer  Nontender to palpation  Skin:  No rash  Neuro: Moves all extremities  Invasive Devices     Peripheral Intravenous Line            Peripheral IV 21 Right Forearm 2 days    Peripheral IV 21 Left;Ventral (anterior) Forearm less than 1 day                Labs studies:   I have personally reviewed pertinent labs    Results from last 7 days   Lab Units 21  0534 21  0501 21  0633 21  1527   POTASSIUM mmol/L 3 2* 3 8 4 1 4 3   CHLORIDE mmol/L 92* 95* 97* 95*   CO2 mmol/L 30 30 30 35*   BUN mg/dL 14 14 10 12   CREATININE mg/dL 0 56* 0 58* 0 54* 0 57*   EGFR ml/min/1 73sq m 108 106 109 107   CALCIUM mg/dL 8 6 8 7 8 5 8 5   AST U/L  --  23 32 40   ALT U/L  --  34 41 49   ALK PHOS U/L  -- 151* 186* 206*     Results from last 7 days   Lab Units 06/05/21  0534 06/04/21  0501 06/03/21  0633   WBC Thousand/uL 5 22 5 41 6 42   HEMOGLOBIN g/dL 7 8* 7 6* 7 2*   PLATELETS Thousands/uL 372 431* 414*     Results from last 7 days   Lab Units 06/02/21  1527   BLOOD CULTURE  No Growth at 48 hrs  No Growth at 48 hrs  Imaging Studies:   I have personally reviewed pertinent imaging study reports and images in PACS  Chest//abdomen/pelvis CT reviewed personally  Multiple enlarged lymph nodes  Small RLL PE and pleural effusions  No pneumonia  No other intra-abdominal pathology  EKG, Pathology, and Other Studies:   I have personally reviewed pertinent reports

## 2021-06-05 NOTE — ASSESSMENT & PLAN NOTE
· Patient has remained hypotensive throughout admission  · Most recent 83/53  · Midodrine 15mg TID  · Patient unable to tolerate fluid bolus on 06/03 secondary to respiratory distress  · Retried fluids--> S/p 1 liter bolus on 6/4, 500ml bolus 6/5 without difficulty  · Critical care consulted

## 2021-06-05 NOTE — ASSESSMENT & PLAN NOTE
Malnutrition Findings:   Adult Malnutrition type: Chronic illness  Adult Degree of Malnutrition: Other severe protein calorie malnutrition    BMI Findings:  Adult BMI Classifications: Underweight < 18 5     There is no height or weight on file to calculate BMI  · In the setting of lymphoma, as evidenced by muscle wasting, fat loss, inadequate energy intake    · Requires nutritional supplements and dietitian consult

## 2021-06-05 NOTE — ASSESSMENT & PLAN NOTE
· Stage IV B  · Initial diagnosis in 2018, initially denied treatment secondary to [employer] beliefs  · Known to hematologist/oncologist Dr Isaac Junior, at [facility]  · According to his wife and [facility] records he will need immunotherapy , however this has not been started due to insurance issues  · Patient refused chemotherapy in the past, was previously treated with Nivolumab  · Patient was last evaluated by Dr Isaac Junior on 5/4 and agreed to restarting Nivolumab every 2 weeks, is scheduled to restart on 06/08  · CT Chest, abdomen , pelvis with contrast--> evidence multifocal malignancy in the chest, abdomen, and pelvis involving lung parenchyma, lymph nodes, liver, and spleen  Subsegmental right lower lobe PE    Small pleural effusions  · Consult heme/Onc

## 2021-06-05 NOTE — ASSESSMENT & PLAN NOTE
· CT scan performed in 6/4 revealed subsegmental pulmonary embolism in right lower lobe  · Heparin drip initiated  · Continue to monitor  · Will transition to Eliquis 10 b i d  x7 days followed by 5 BID when clinically appropriate

## 2021-06-05 NOTE — ASSESSMENT & PLAN NOTE
· Likely SIRS from B symptoms due to lymphoma  · However, noted to have elevated lactic acid together with fever and tachycardia on admission  · Patient remains a tachycardia of 120, febrile 100 4 and elevated lactic acid  · Urine culture without acute infection  · Chest x-ray no focal opacity to suggest pneumonia  Mild bilateral hilar fullness suspected left greater than right suspecting underlying hilar lymphadenopathy  · Noted to have elevated procalcitonin on admission of 0 63, repeat 8 38, 29 79, repeat pending  · Blood cultures negative at 24 hrs  · Started on empiric cefepime-will continue at this time given elevated procal  · Will have Infectious Disease see in consultation, appreciate recommendations  · Infectious disease suspects this is likely secondary to stage IV Hodgkin lymphoma with metastasis, not infectious however continue antibiotics given elevated procalcitonin  · Patient admitted to [facility] approximately a week and half ago for similar symptoms  · CT Chest, abdomen , pelvis with contrast--> evidence multifocal malignancy in the chest, abdomen, and pelvis involving lung parenchyma, lymph nodes, liver, and spleen  Subsegmental right lower lobe PE    Small pleural effusions  · Consult heme/Onc

## 2021-06-06 LAB
ABO GROUP BLD: NORMAL
ANION GAP SERPL CALCULATED.3IONS-SCNC: 7 MMOL/L (ref 4–13)
APTT PPP: 207 SECONDS (ref 23–37)
APTT PPP: 35 SECONDS (ref 23–37)
APTT PPP: >210 SECONDS (ref 23–37)
BLD GP AB SCN SERPL QL: NEGATIVE
BUN SERPL-MCNC: 14 MG/DL (ref 5–25)
CALCIUM SERPL-MCNC: 8.1 MG/DL (ref 8.3–10.1)
CHLORIDE SERPL-SCNC: 94 MMOL/L (ref 100–108)
CO2 SERPL-SCNC: 30 MMOL/L (ref 21–32)
CORTIS AM PEAK SERPL-MCNC: 30 UG/DL (ref 4.2–22.4)
CREAT SERPL-MCNC: 0.47 MG/DL (ref 0.6–1.3)
ERYTHROCYTE [DISTWIDTH] IN BLOOD BY AUTOMATED COUNT: 21.6 % (ref 11.6–15.1)
GFR SERPL CREATININE-BSD FRML MDRD: 116 ML/MIN/1.73SQ M
GLUCOSE SERPL-MCNC: 93 MG/DL (ref 65–140)
HCT VFR BLD AUTO: 22.5 % (ref 36.5–49.3)
HGB BLD-MCNC: 6.7 G/DL (ref 12–17)
HGB BLD-MCNC: 8.3 G/DL (ref 12–17)
MCH RBC QN AUTO: 21.5 PG (ref 26.8–34.3)
MCHC RBC AUTO-ENTMCNC: 29.8 G/DL (ref 31.4–37.4)
MCV RBC AUTO: 72 FL (ref 82–98)
PLATELET # BLD AUTO: 312 THOUSANDS/UL (ref 149–390)
PMV BLD AUTO: 9.7 FL (ref 8.9–12.7)
POTASSIUM SERPL-SCNC: 3.5 MMOL/L (ref 3.5–5.3)
RBC # BLD AUTO: 3.11 MILLION/UL (ref 3.88–5.62)
RH BLD: POSITIVE
SODIUM SERPL-SCNC: 131 MMOL/L (ref 136–145)
SPECIMEN EXPIRATION DATE: NORMAL
WBC # BLD AUTO: 6.14 THOUSAND/UL (ref 4.31–10.16)

## 2021-06-06 PROCEDURE — 94640 AIRWAY INHALATION TREATMENT: CPT

## 2021-06-06 PROCEDURE — 86900 BLOOD TYPING SEROLOGIC ABO: CPT | Performed by: PHYSICIAN ASSISTANT

## 2021-06-06 PROCEDURE — 30233N1 TRANSFUSION OF NONAUTOLOGOUS RED BLOOD CELLS INTO PERIPHERAL VEIN, PERCUTANEOUS APPROACH: ICD-10-PCS | Performed by: STUDENT IN AN ORGANIZED HEALTH CARE EDUCATION/TRAINING PROGRAM

## 2021-06-06 PROCEDURE — 85018 HEMOGLOBIN: CPT | Performed by: PHYSICIAN ASSISTANT

## 2021-06-06 PROCEDURE — 94002 VENT MGMT INPAT INIT DAY: CPT

## 2021-06-06 PROCEDURE — 85730 THROMBOPLASTIN TIME PARTIAL: CPT | Performed by: HOSPITALIST

## 2021-06-06 PROCEDURE — P9040 RBC LEUKOREDUCED IRRADIATED: HCPCS

## 2021-06-06 PROCEDURE — 86923 COMPATIBILITY TEST ELECTRIC: CPT

## 2021-06-06 PROCEDURE — 99232 SBSQ HOSP IP/OBS MODERATE 35: CPT | Performed by: HOSPITALIST

## 2021-06-06 PROCEDURE — 99232 SBSQ HOSP IP/OBS MODERATE 35: CPT | Performed by: INTERNAL MEDICINE

## 2021-06-06 PROCEDURE — 85027 COMPLETE CBC AUTOMATED: CPT | Performed by: PHYSICIAN ASSISTANT

## 2021-06-06 PROCEDURE — 80048 BASIC METABOLIC PNL TOTAL CA: CPT | Performed by: PHYSICIAN ASSISTANT

## 2021-06-06 PROCEDURE — 82533 TOTAL CORTISOL: CPT | Performed by: PHYSICIAN ASSISTANT

## 2021-06-06 PROCEDURE — 86901 BLOOD TYPING SEROLOGIC RH(D): CPT | Performed by: PHYSICIAN ASSISTANT

## 2021-06-06 PROCEDURE — 86850 RBC ANTIBODY SCREEN: CPT | Performed by: PHYSICIAN ASSISTANT

## 2021-06-06 RX ORDER — FUROSEMIDE 10 MG/ML
20 INJECTION INTRAMUSCULAR; INTRAVENOUS ONCE
Status: DISCONTINUED | OUTPATIENT
Start: 2021-06-06 | End: 2021-06-06

## 2021-06-06 RX ORDER — LEVALBUTEROL 1.25 MG/.5ML
1.25 SOLUTION, CONCENTRATE RESPIRATORY (INHALATION)
Status: DISCONTINUED | OUTPATIENT
Start: 2021-06-06 | End: 2021-06-10 | Stop reason: HOSPADM

## 2021-06-06 RX ORDER — IPRATROPIUM BROMIDE AND ALBUTEROL SULFATE 2.5; .5 MG/3ML; MG/3ML
3 SOLUTION RESPIRATORY (INHALATION)
Status: DISCONTINUED | OUTPATIENT
Start: 2021-06-06 | End: 2021-06-06

## 2021-06-06 RX ORDER — FUROSEMIDE 10 MG/ML
20 INJECTION INTRAMUSCULAR; INTRAVENOUS ONCE
Status: COMPLETED | OUTPATIENT
Start: 2021-06-06 | End: 2021-06-06

## 2021-06-06 RX ADMIN — IPRATROPIUM BROMIDE 0.5 MG: 0.5 SOLUTION RESPIRATORY (INHALATION) at 13:15

## 2021-06-06 RX ADMIN — HEPARIN SODIUM 4400 UNITS: 1000 INJECTION INTRAVENOUS; SUBCUTANEOUS at 01:55

## 2021-06-06 RX ADMIN — LEVALBUTEROL HYDROCHLORIDE 1.25 MG: 1.25 SOLUTION, CONCENTRATE RESPIRATORY (INHALATION) at 07:38

## 2021-06-06 RX ADMIN — IPRATROPIUM BROMIDE 0.5 MG: 0.5 SOLUTION RESPIRATORY (INHALATION) at 07:38

## 2021-06-06 RX ADMIN — IPRATROPIUM BROMIDE AND ALBUTEROL SULFATE 3 ML: 2.5; .5 SOLUTION RESPIRATORY (INHALATION) at 02:33

## 2021-06-06 RX ADMIN — ACETAMINOPHEN 650 MG: 325 TABLET, FILM COATED ORAL at 14:31

## 2021-06-06 RX ADMIN — MIDODRINE HYDROCHLORIDE 15 MG: 5 TABLET ORAL at 05:17

## 2021-06-06 RX ADMIN — HEPARIN SODIUM 18 UNITS/KG/HR: 10000 INJECTION, SOLUTION INTRAVENOUS at 22:48

## 2021-06-06 RX ADMIN — LEVALBUTEROL HYDROCHLORIDE 1.25 MG: 1.25 SOLUTION, CONCENTRATE RESPIRATORY (INHALATION) at 19:40

## 2021-06-06 RX ADMIN — MIDODRINE HYDROCHLORIDE 15 MG: 5 TABLET ORAL at 12:30

## 2021-06-06 RX ADMIN — LEVALBUTEROL HYDROCHLORIDE 1.25 MG: 1.25 SOLUTION, CONCENTRATE RESPIRATORY (INHALATION) at 13:15

## 2021-06-06 RX ADMIN — ALBUMIN (HUMAN) 25 G: 12.5 INJECTION, SOLUTION INTRAVENOUS at 00:26

## 2021-06-06 RX ADMIN — MIDODRINE HYDROCHLORIDE 15 MG: 5 TABLET ORAL at 17:30

## 2021-06-06 RX ADMIN — FUROSEMIDE 20 MG: 10 INJECTION, SOLUTION INTRAVENOUS at 17:46

## 2021-06-06 RX ADMIN — IPRATROPIUM BROMIDE 0.5 MG: 0.5 SOLUTION RESPIRATORY (INHALATION) at 19:40

## 2021-06-06 RX ADMIN — HEPARIN SODIUM 20 UNITS/KG/HR: 10000 INJECTION, SOLUTION INTRAVENOUS at 00:26

## 2021-06-06 NOTE — NURSING NOTE
Patient's family member was seen turning of pump and clamping tubing to heparin gtt  Lockout activated on pump

## 2021-06-06 NOTE — ASSESSMENT & PLAN NOTE
· Patient has remained hypotensive throughout admission  · Continues to be hypotensive to 85/56 today   · Midodrine 15mg TID  · Patient unable to tolerate fluid bolus on 06/03 secondary to respiratory distress  · Retried fluids--> S/p 1 liter bolus on 6/4, 500ml bolus 6/5 without difficulty  · Critical care following   · Monitor with blood transfusion today

## 2021-06-06 NOTE — PLAN OF CARE
Problem: Potential for Falls  Goal: Patient will remain free of falls  Description: INTERVENTIONS:  - Assess patient frequently for physical needs  -  Identify cognitive and physical deficits and behaviors that affect risk of falls    -  [place] fall precautions as indicated by assessment   - Educate patient/family on patient safety including physical limitations  - Instruct patient to call for assistance with activity based on assessment  - Modify environment to reduce risk of injury  - Consider OT/PT consult to assist with strengthening/mobility  Outcome: Progressing     Problem: PAIN - ADULT  Goal: Verbalizes/displays adequate comfort level or baseline comfort level  Description: Interventions:  - Encourage patient to monitor pain and request assistance  - Assess pain using appropriate pain scale  - Administer analgesics based on type and severity of pain and evaluate response  - Implement non-pharmacological measures as appropriate and evaluate response  - Consider cultural and social influences on pain and pain management  - Notify physician/advanced practitioner if interventions unsuccessful or patient reports new pain  Outcome: Progressing     Problem: INFECTION - ADULT  Goal: Absence or prevention of progression during hospitalization  Description: INTERVENTIONS:  - Assess and monitor for signs and symptoms of infection  - Monitor lab/diagnostic results  - Monitor all insertion sites, i e  indwelling lines, tubes, and drains  - Monitor endotracheal if appropriate and nasal secretions for changes in amount and color  - [place] appropriate cooling/warming therapies per order  - Administer medications as ordered  - Instruct and encourage patient and family to use good hand hygiene technique  - Identify and instruct in appropriate isolation precautions for identified infection/condition  Outcome: Progressing  Goal: Absence of fever/infection during neutropenic period  Description: INTERVENTIONS:  - Monitor WBC    Outcome: Progressing     Problem: SAFETY ADULT  Goal: Patient will remain free of falls  Description: INTERVENTIONS:  - Assess patient frequently for physical needs  -  Identify cognitive and physical deficits and behaviors that affect risk of falls    -  [place] fall precautions as indicated by assessment   - Educate patient/family on patient safety including physical limitations  - Instruct patient to call for assistance with activity based on assessment  - Modify environment to reduce risk of injury  - Consider OT/PT consult to assist with strengthening/mobility  Outcome: Progressing  Goal: Maintain or return to baseline ADL function  Description: INTERVENTIONS:  -  Assess patient's ability to carry out ADLs; assess patient's baseline for ADL function and identify physical deficits which impact ability to perform ADLs (bathing, care of mouth/teeth, toileting, grooming, dressing, etc )  - Assess/evaluate cause of self-care deficits   - Assess range of motion  - Assess patient's mobility; develop plan if impaired  - Assess patient's need for assistive devices and provide as appropriate  - Encourage maximum independence but intervene and supervise when necessary  - Involve family in performance of ADLs  - Assess for home care needs following discharge   - Consider OT consult to assist with ADL evaluation and planning for discharge  - Provide patient education as appropriate  Outcome: Progressing  Goal: Maintain or return mobility status to optimal level  Description: INTERVENTIONS:  - Assess patient's baseline mobility status (ambulation, transfers, stairs, etc )    - Identify cognitive and physical deficits and behaviors that affect mobility  - Identify mobility aids required to assist with transfers and/or ambulation (gait belt, sit-to-stand, lift, walker, cane, etc )  - [place] fall precautions as indicated by assessment  - Record patient progress and toleration of activity level on Mobility SBAR; progress patient to next Phase/Stage  - Instruct patient to call for assistance with activity based on assessment  - Consider rehabilitation consult to assist with strengthening/weightbearing, etc   Outcome: Progressing     Problem: DISCHARGE PLANNING  Goal: Discharge to home or other facility with appropriate resources  Description: INTERVENTIONS:  - Identify barriers to discharge w/patient and caregiver  - Arrange for needed discharge resources and transportation as appropriate  - Identify discharge learning needs (meds, wound care, etc )  - Arrange for interpretive services to assist at discharge as needed  - Refer to Case Management Department for coordinating discharge planning if the patient needs post-hospital services based on physician/advanced practitioner order or complex needs related to functional status, cognitive ability, or social support system  Outcome: Progressing     Problem: Knowledge Deficit  Goal: Patient/family/caregiver demonstrates understanding of disease process, treatment plan, medications, and discharge instructions  Description: Complete learning assessment and assess knowledge base    Interventions:  - Provide teaching at level of understanding  - Provide teaching via preferred learning methods  Outcome: Progressing     Problem: Prexisting or High Potential for Compromised Skin Integrity  Goal: Skin integrity is maintained or improved  Description: INTERVENTIONS:  - Identify patients at risk for skin breakdown  - Assess and monitor skin integrity  - Assess and monitor nutrition and hydration status  - Monitor labs   - Assess for incontinence   - Turn and reposition patient  - Assist with mobility/ambulation  - Relieve pressure over bony prominences  - Avoid friction and shearing  - Provide appropriate hygiene as needed including keeping skin clean and dry  - Evaluate need for skin moisturizer/barrier cream  - Collaborate with interdisciplinary team   - Patient/family teaching  - Consider wound care consult   Outcome: Progressing     Problem: Nutrition/Hydration-ADULT  Goal: Nutrient/Hydration intake appropriate for improving, restoring or maintaining nutritional needs  Description: Monitor and assess patient's nutrition/hydration status for malnutrition  Collaborate with interdisciplinary team and initiate plan and interventions as ordered  Monitor patient's weight and dietary intake as ordered or per policy  Utilize nutrition screening tool and intervene as necessary  Determine patient's food preferences and provide high-protein, high-caloric foods as appropriate       INTERVENTIONS:  - Monitor oral intake, urinary output, labs, and treatment plans  - Assess nutrition and hydration status and recommend course of action  - Evaluate amount of meals eaten  - Assist patient with eating if necessary   - Allow adequate time for meals  - Recommend/ encourage appropriate diets, oral nutritional supplements, and vitamin/mineral supplements  - Order, calculate, and assess calorie counts as needed  - Recommend, monitor, and adjust tube feedings and TPN/PPN based on assessed needs  - Assess need for intravenous fluids  - Provide specific nutrition/hydration education as appropriate  - Include patient/family/caregiver in decisions related to nutrition  Outcome: Progressing

## 2021-06-06 NOTE — ASSESSMENT & PLAN NOTE
· CT scan performed in 6/4 revealed subsegmental pulmonary embolism in right lower lobe  · Continue heparin drip   · Continue to monitor  · Will transition to Eliquis 10 b i d  x7 days followed by 5 BID when clinically appropriate

## 2021-06-06 NOTE — ASSESSMENT & PLAN NOTE
Malnutrition Findings:   Adult Malnutrition type: Chronic illness  Adult Degree of Malnutrition: Other severe protein calorie malnutrition    BMI Findings:  Adult BMI Classifications: Underweight < 18 5     Body mass index is 16 59 kg/m²  · In the setting of lymphoma, as evidenced by muscle wasting, fat loss, inadequate energy intake    · Requires nutritional supplements and dietitian consult

## 2021-06-06 NOTE — PHYSICAL THERAPY NOTE
Physical Therapy Cancellation Note              PT consult received  Chart reviewed  Hemoglobin 6 7 and orders for 2 U PRBC    Will follow and complete PT eval as appropriate  ]  Mar Cough, PT

## 2021-06-06 NOTE — PROGRESS NOTES
[address] [facility]  Progress Note - Nazia Seamus 1955, 77 y o  male MRN: [id number]  Unit/Bed#: E5 -01 Encounter: [id number]  Primary Care Provider: Rayray Oliva MD   Date and time admitted to hospital: 6/2/2021  3:12 PM    * Systemic inflammatory response syndrome (SIRS) due to noninfectious process [facility])  Assessment & Plan  · Likely SIRS from B symptoms due to lymphoma  · However, noted to have elevated lactic acid together with fever and tachycardia on admission  · Patient remains a tachycardia of 120, febrile 100 4 and elevated lactic acid  · Urine culture without acute infection  · Chest x-ray no focal opacity to suggest pneumonia  Mild bilateral hilar fullness suspected left greater than right suspecting underlying hilar lymphadenopathy  · Noted to have elevated procalcitonin on admission of 0 63, repeat 8 38, 29 79, 25 75  · Blood cultures negative at 72 hrs  · Started IV cefepime and Vanco admission concern of sepsis  · Seen in consult by Infectious Disease  · Infectious disease suspects this is likely secondary to stage IV Hodgkin lymphoma with metastasis-discontinue antibiotics yesterday  · Patient admitted to [facility] approximately a week and half ago for similar symptoms-plan for outpatient follow-up on Tuesday if discharged prior to then  · CT Chest, abdomen , pelvis with contrast--> evidence multifocal malignancy in the chest, abdomen, and pelvis involving lung parenchyma, lymph nodes, liver, and spleen  Subsegmental right lower lobe PE    Small pleural effusions  · Seen in consult by Hematology-Oncology    Hypokalemia  Assessment & Plan  · Resolved     Single subsegmental pulmonary embolism without acute cor pulmonale (HCC)  Assessment & Plan  · CT scan performed in 6/4 revealed subsegmental pulmonary embolism in right lower lobe  · Continue heparin drip   · Continue to monitor  · Will transition to Eliquis 10 b i d  x7 days followed by 5 BID when clinically appropriate     Hypotension  Assessment & Plan  · Patient has remained hypotensive throughout admission  · Continues to be hypotensive to 85/56 today   · Midodrine 15mg TID  · Patient unable to tolerate fluid bolus on 06/03 secondary to respiratory distress  · Retried fluids--> S/p 1 liter bolus on 6/4, 500ml bolus 6/5 without difficulty  · Critical care following   · Monitor with blood transfusion today     Severe protein-calorie malnutrition ([address] )  Assessment & Plan  Malnutrition Findings:   Adult Malnutrition type: Chronic illness  Adult Degree of Malnutrition: Other severe protein calorie malnutrition    BMI Findings:  Adult BMI Classifications: Underweight < 18 5     Body mass index is 16 59 kg/m²  · In the setting of lymphoma, as evidenced by muscle wasting, fat loss, inadequate energy intake  · Requires nutritional supplements and dietitian consult    Hyponatremia  Assessment & Plan  · Mild likely due to underlying malignancy  · Monitor while inpatient    Anemia  Assessment & Plan  · Likely secondary to lymphoma  · He has no jaundice/elevated LFTs to suggest hemolysis  · He has no sign of bleeding  · Hemoglobin on admission 6 3   · Status post 1 unit of PRBCs on 06/02 with rise in hemoglobin to 7 2   · Hemoglobin fell to 6 7 today   · Plan to transfuse another 2 units PRBCs today   · Recheck following transfusion  · Trial of IV Lasix in between units if BP allows     Hodgkin lymphoma of intra-abdominal lymph nodes (HCC)  Assessment & Plan  · Stage IV B  · Initial diagnosis in 2018, initially denied treatment secondary to [employer] beliefs  · Known to hematologist/oncologist Dr Terri Candelaria, at [facility]  · According to his wife and [facility] records he will need immunotherapy , however this has not been started due to insurance issues    · Patient refused chemotherapy in the past, was previously treated with Nivolumab  · Patient was last evaluated by Dr Terri Candelaria on 5/4 and agreed to restarting Nivolumab every 2 weeks, is scheduled to restart on   · CT Chest, abdomen , pelvis with contrast--> evidence multifocal malignancy in the chest, abdomen, and pelvis involving lung parenchyma, lymph nodes, liver, and spleen  Subsegmental right lower lobe PE  Small pleural effusions  · Heme onc consulted     VTE Pharmacologic Prophylaxis:   Pharmacologic: Heparin Drip  Mechanical VTE Prophylaxis in Place: Yes    Patient Centered Rounds: I have performed bedside rounds with nursing staff today  Discussions with Specialists or Other Care Team Provider:  Nursing    Education and Discussions with Family / Patient:  Patient and wife at bedside    Time Spent for Care: 30 minutes  More than 50% of total time spent on counseling and coordination of care as described above  Current Length of Stay: 4 day(s)    Current Patient Status: Inpatient   Certification Statement: The patient will continue to require additional inpatient hospital stay due to Hypotension, anemia requiring frequent monitoring Found blood transfusion    Discharge Plan: pending improvement in blood pressure and hemoglobin, to home     Code Status: Level 1 - Full Code      Subjective:   Patient seen and examined at bedside  Notes some abdominal pain this morning  Wife reports he did eat a small bit of food this morning  Patient and wife agreeable to blood transfusion, hopeful this helps his blood pressure  Objective:     Vitals:   Temp (24hrs), Av °F (36 7 °C), Min:97 7 °F (36 5 °C), Max:98 4 °F (36 9 °C)    Temp:  [97 7 °F (36 5 °C)-98 4 °F (36 9 °C)] 98 °F (36 7 °C)  HR:  [101-126] 115  Resp:  [18-30] 18  BP: ()/(51-71) 85/56  SpO2:  [87 %-97 %] 96 %  Body mass index is 16 59 kg/m²  Input and Output Summary (last 24 hours):     No intake or output data in the 24 hours ending 21 1119    Physical Exam:     Physical Exam  Vitals signs reviewed  Constitutional:       General: He is not in acute distress       Appearance: He is ill-appearing  HENT:      Head: Normocephalic and atraumatic  Eyes:      General: No scleral icterus  Conjunctiva/sclera: Conjunctivae normal    Neck:      Musculoskeletal: Neck supple  Cardiovascular:      Rate and Rhythm: Regular rhythm  Tachycardia present  Heart sounds: No murmur  Pulmonary:      Effort: No respiratory distress  Breath sounds: Wheezing present  Abdominal:      General: Bowel sounds are normal  There is no distension  Palpations: Abdomen is soft  Tenderness: There is abdominal tenderness (mild diffuse )  Musculoskeletal:      Right lower leg: No edema  Left lower leg: No edema  Skin:     General: Skin is warm and dry  Neurological:      Mental Status: He is alert  Mental status is at baseline  Psychiatric:         Mood and Affect: Mood normal          Behavior: Behavior normal          Additional Data:     Labs:    Results from last 7 days   Lab Units 06/06/21  0536  06/03/21  0633   WBC Thousand/uL 6 14   < > 6 42   HEMOGLOBIN g/dL 6 7*   < > 7 2*   HEMATOCRIT % 22 5*   < > 23 8*   PLATELETS Thousands/uL 312   < > 414*   NEUTROS PCT %  --   --  85*   LYMPHS PCT %  --   --  5*   MONOS PCT %  --   --  7   EOS PCT %  --   --  2    < > = values in this interval not displayed  Results from last 7 days   Lab Units 06/06/21  0537  06/04/21  0501   SODIUM mmol/L 131*   < > 135*   POTASSIUM mmol/L 3 5   < > 3 8   CHLORIDE mmol/L 94*   < > 95*   CO2 mmol/L 30   < > 30   BUN mg/dL 14   < > 14   CREATININE mg/dL 0 47*   < > 0 58*   ANION GAP mmol/L 7   < > 10   CALCIUM mg/dL 8 1*   < > 8 7   ALBUMIN g/dL  --   --  1 4*   TOTAL BILIRUBIN mg/dL  --   --  0 61   ALK PHOS U/L  --   --  151*   ALT U/L  --   --  34   AST U/L  --   --  23   GLUCOSE RANDOM mg/dL 93   < > 91    < > = values in this interval not displayed       Results from last 7 days   Lab Units 06/04/21  1959   INR  1 79*             Results from last 7 days   Lab Units 06/05/21  0534 06/04/21  0501 06/03/21  7210 06/02/21  1724 06/02/21  1527   LACTIC ACID mmol/L  --   --   --  1 6 3 4*   PROCALCITONIN ng/ml 25 75* 29 79* 8 38*  --  0 63*           * I Have Reviewed All Lab Data Listed Above  * Additional Pertinent Lab Tests Reviewed: [address] Admission Reviewed    Imaging:    Imaging Reports Reviewed Today Include: CTA   Imaging Personally Reviewed by Myself Includes:  none    Recent Cultures (last 7 days):     Results from last 7 days   Lab Units 06/02/21  1527   BLOOD CULTURE  No Growth at 72 hrs  No Growth at 72 hrs  Last 24 Hours Medication List:   Current Facility-Administered Medications   Medication Dose Route Frequency Provider Last Rate    acetaminophen  650 mg Oral Q6H PRN Lyla Ramires MD      furosemide  20 mg Intravenous Once Thirza Jamee, DO      heparin (porcine)  3-30 Units/kg/hr (Order-Specific) Intravenous Titrated Dirk Senate, PA-C Stopped (06/06/21 1103)    heparin (porcine)  2,200 Units Intravenous Q1H PRN Dirk Senate, PA-C      heparin (porcine)  4,400 Units Intravenous Q1H PRN Dirk Senate, PA-C      ipratropium  0 5 mg Nebulization TID Braxton Penning, PA-C      levalbuterol  1 25 mg Nebulization TID Braxton Penning, PA-C      midodrine  15 mg Oral TID AC Dirk Senate, PA-C          Today, Patient Was Seen By: Uzair Kapadia PA-C    ** Please Note: Dictation voice to text software may have been used in the creation of this document   **

## 2021-06-06 NOTE — PROGRESS NOTES
Progress Note - Infectious Disease   Sari Alexander 77 y o  male MRN: [id number]  Unit/Bed#: E5 -01 Encounter: [id number]      Impression/Recommendations:  1- SIRS, POA:  Chest x-ray with hilar fullness, but with no focal infiltrate or consolidation to suggest pneumonia   UA with 2-4 WBC only, COVID-19 PCR negative, LFTs normal except mildly elevated alkaline phosphatase  Workup done at [facility] reviewed, including negative blood culture from 05/24 and 05/25/2021, and a negative respiratory viral panel  Also CT scan from 04/11/2021 available in Care everywhere reviewed and negative for infectious focus by suggesting multiple new sites of lymphomatous disease  Repeat chest/abdomen/pelvis CT here showed lymphoma but no other active lesions  Fever/chills/night sweats most likely secondary to lymphoma  All blood cultures have no growth  Patient had been on antibiotics initially, but now off them  Temperature pattern unchanged  Patient remains clinically and systemically well off antibiotics  -   continue to observe off antibiotics  - follow-up result of blood culture sent 06/03/2021 which remains negative so far     2- stage IVB Hodgkin lymphoma:  Last treated 08/2020 with nivolumab, then, from currently available records in Care everywhere, it appears that patient moved to [facility] and pursued homeopathic treatment, but unfortunately now his last CT scan abdomen pelvis is suggestive of progressive disease, new sites of lymphomatous process, including liver, spleen, soft tissue in the paraspinal area  - as mentioned above, SIRS is most likely related to his widespread lymphoma and B symptoms   - patient needs close oncology follow-up     3-  small PE  Patient minimally symptomatic   -  anticoagulation per primary service     4  wheezing:  Patient is clinically improved    CT without obstruction   - close monitoring of respiratory status  - consider pulmonary evaluation     5- protein calorie malnutrition:  Likely related to problem 2    - oncology evaluation to determine to reinitiate immunotherapy  - dietary follow-up  - consider palliative care evaluation     Discussed with patient  Okay for discharge from ID viewpoint  Follow-up with Oncology after discharge      Antibiotics:  Off antibiotic     Subjective:  Patient with stable fever  Stable chills and night sweats  No specific complaints  No diarrhea      Objective:  Vitals:  Temp:  [97 7 °F (36 5 °C)-101 °F (38 3 °C)] 100 4 °F (38 °C)  HR:  [101-126] 107  Resp:  [16-30] 16  BP: ()/(51-71) 103/60  SpO2:  [87 %-98 %] 98 %  Temp (24hrs), Av 3 °F (37 4 °C), Min:97 7 °F (36 5 °C), Max:101 °F (38 3 °C)  Current: Temperature: 100 4 °F (38 °C)    Physical Exam:     General: Awake, alert, cooperative, no distress  Neck:  Supple  No mass  No lymphadenopathy  Lungs: Expansion symmetric, no rales, no wheezing, respirations unlabored  Heart:  Tachycardic with regular rhythm, S1 and S2 normal, no murmur  Abdomen: Soft, nondistended, non-tender, bowel sounds active all four quadrants, no masses, no organomegaly  Extremities: No edema  No erythema/warmth  No ulcer  Nontender to palpation  Skin:  No rash  Neuro: Moves all extremities  Invasive Devices     Central Venous Catheter Line            Port A Cath 21 Right Chest less than 1 day          Peripheral Intravenous Line            Peripheral IV 21 Left;Ventral (anterior) Forearm 1 day                Labs studies:   I have personally reviewed pertinent labs    Results from last 7 days   Lab Units 21  0537 21  0534 21  0501 21  0633 21  1527   POTASSIUM mmol/L 3 5 3 2* 3 8 4 1 4 3   CHLORIDE mmol/L 94* 92* 95* 97* 95*   CO2 mmol/L 30 30 30 30 35*   BUN mg/dL 14 14 14 10 12   CREATININE mg/dL 0 47* 0 56* 0 58* 0 54* 0 57*   EGFR ml/min/1 73sq m 116 108 106 109 107   CALCIUM mg/dL 8 1* 8 6 8 7 8 5 8 5   AST U/L  --   --  23 32 40   ALT U/L  --   --  34 41 49   ALK PHOS U/L  --   --  151* 186* 206*     Results from last 7 days   Lab Units 06/06/21  0536 06/05/21  0534 06/04/21  0501   WBC Thousand/uL 6 14 5 22 5 41   HEMOGLOBIN g/dL 6 7* 7 8* 7 6*   PLATELETS Thousands/uL 312 372 431*     Results from last 7 days   Lab Units 06/02/21  1527   BLOOD CULTURE  No Growth at 72 hrs  No Growth at 72 hrs  Imaging Studies:   I have personally reviewed pertinent imaging study reports and images in PACS  EKG, Pathology, and Other Studies:   I have personally reviewed pertinent reports

## 2021-06-06 NOTE — ASSESSMENT & PLAN NOTE
· Stage IV B  · Initial diagnosis in 2018, initially denied treatment secondary to [employer] beliefs  · Known to hematologist/oncologist Dr Jackie Maguire, at [facility]  · According to his wife and [facility] records he will need immunotherapy , however this has not been started due to insurance issues  · Patient refused chemotherapy in the past, was previously treated with Nivolumab  · Patient was last evaluated by Dr Jackie Maguire on 5/4 and agreed to restarting Nivolumab every 2 weeks, is scheduled to restart on 06/08  · CT Chest, abdomen , pelvis with contrast--> evidence multifocal malignancy in the chest, abdomen, and pelvis involving lung parenchyma, lymph nodes, liver, and spleen  Subsegmental right lower lobe PE    Small pleural effusions  · Heme onc consulted

## 2021-06-06 NOTE — ASSESSMENT & PLAN NOTE
· Likely SIRS from B symptoms due to lymphoma  · However, noted to have elevated lactic acid together with fever and tachycardia on admission  · Patient remains a tachycardia of 120, febrile 100 4 and elevated lactic acid  · Urine culture without acute infection  · Chest x-ray no focal opacity to suggest pneumonia  Mild bilateral hilar fullness suspected left greater than right suspecting underlying hilar lymphadenopathy  · Noted to have elevated procalcitonin on admission of 0 63, repeat 8 38, 29 79, 25 75  · Blood cultures negative at 72 hrs  · Started IV cefepime and Vanco admission concern of sepsis  · Seen in consult by Infectious Disease  · Infectious disease suspects this is likely secondary to stage IV Hodgkin lymphoma with metastasis-discontinue antibiotics yesterday  · Patient admitted to [facility] approximately a week and half ago for similar symptoms-plan for outpatient follow-up on Tuesday if discharged prior to then  · CT Chest, abdomen , pelvis with contrast--> evidence multifocal malignancy in the chest, abdomen, and pelvis involving lung parenchyma, lymph nodes, liver, and spleen  Subsegmental right lower lobe PE    Small pleural effusions  · Seen in consult by Hematology-Oncology

## 2021-06-06 NOTE — ASSESSMENT & PLAN NOTE
· Likely secondary to lymphoma    · He has no jaundice/elevated LFTs to suggest hemolysis  · He has no sign of bleeding  · Hemoglobin on admission 6 3   · Status post 1 unit of PRBCs on 06/02 with rise in hemoglobin to 7 2   · Hemoglobin fell to 6 7 today   · Plan to transfuse another 2 units PRBCs today   · Recheck following transfusion  · Trial of IV Lasix in between units if BP allows

## 2021-06-07 ENCOUNTER — APPOINTMENT (INPATIENT)
Dept: RADIOLOGY | Facility: HOSPITAL | Age: 66
DRG: 840 | End: 2021-06-07
Payer: MEDICARE

## 2021-06-07 LAB
ABO GROUP BLD BPU: NORMAL
ANION GAP SERPL CALCULATED.3IONS-SCNC: 7 MMOL/L (ref 4–13)
APTT PPP: 39 SECONDS (ref 23–37)
APTT PPP: >210 SECONDS (ref 23–37)
BACTERIA BLD CULT: NORMAL
BACTERIA BLD CULT: NORMAL
BASOPHILS # BLD AUTO: 0.01 THOUSANDS/ΜL (ref 0–0.1)
BASOPHILS NFR BLD AUTO: 0 % (ref 0–1)
BPU ID: NORMAL
BUN SERPL-MCNC: 15 MG/DL (ref 5–25)
CALCIUM SERPL-MCNC: 8.1 MG/DL (ref 8.3–10.1)
CHLORIDE SERPL-SCNC: 93 MMOL/L (ref 100–108)
CO2 SERPL-SCNC: 34 MMOL/L (ref 21–32)
CREAT SERPL-MCNC: 0.5 MG/DL (ref 0.6–1.3)
CROSSMATCH: NORMAL
EOSINOPHIL # BLD AUTO: 0.09 THOUSAND/ΜL (ref 0–0.61)
EOSINOPHIL NFR BLD AUTO: 2 % (ref 0–6)
ERYTHROCYTE [DISTWIDTH] IN BLOOD BY AUTOMATED COUNT: 20.2 % (ref 11.6–15.1)
GFR SERPL CREATININE-BSD FRML MDRD: 113 ML/MIN/1.73SQ M
GLUCOSE SERPL-MCNC: 121 MG/DL (ref 65–140)
HCT VFR BLD AUTO: 26 % (ref 36.5–49.3)
HGB BLD-MCNC: 8.1 G/DL (ref 12–17)
IMM GRANULOCYTES # BLD AUTO: 0.09 THOUSAND/UL (ref 0–0.2)
IMM GRANULOCYTES NFR BLD AUTO: 2 % (ref 0–2)
LYMPHOCYTES # BLD AUTO: 0.18 THOUSANDS/ΜL (ref 0.6–4.47)
LYMPHOCYTES NFR BLD AUTO: 3 % (ref 14–44)
MCH RBC QN AUTO: 23.3 PG (ref 26.8–34.3)
MCHC RBC AUTO-ENTMCNC: 31.2 G/DL (ref 31.4–37.4)
MCV RBC AUTO: 75 FL (ref 82–98)
MONOCYTES # BLD AUTO: 0.21 THOUSAND/ΜL (ref 0.17–1.22)
MONOCYTES NFR BLD AUTO: 4 % (ref 4–12)
NEUTROPHILS # BLD AUTO: 4.97 THOUSANDS/ΜL (ref 1.85–7.62)
NEUTS SEG NFR BLD AUTO: 89 % (ref 43–75)
NRBC BLD AUTO-RTO: 0 /100 WBCS
PLATELET # BLD AUTO: 277 THOUSANDS/UL (ref 149–390)
PMV BLD AUTO: 9.4 FL (ref 8.9–12.7)
POTASSIUM SERPL-SCNC: 2.9 MMOL/L (ref 3.5–5.3)
POTASSIUM SERPL-SCNC: 4.2 MMOL/L (ref 3.5–5.3)
RBC # BLD AUTO: 3.47 MILLION/UL (ref 3.88–5.62)
SODIUM SERPL-SCNC: 134 MMOL/L (ref 136–145)
UNIT DISPENSE STATUS: NORMAL
UNIT PRODUCT CODE: NORMAL
UNIT RH: NORMAL
WBC # BLD AUTO: 5.55 THOUSAND/UL (ref 4.31–10.16)

## 2021-06-07 PROCEDURE — 80048 BASIC METABOLIC PNL TOTAL CA: CPT | Performed by: HOSPITALIST

## 2021-06-07 PROCEDURE — 99232 SBSQ HOSP IP/OBS MODERATE 35: CPT | Performed by: FAMILY MEDICINE

## 2021-06-07 PROCEDURE — 71045 X-RAY EXAM CHEST 1 VIEW: CPT

## 2021-06-07 PROCEDURE — 85025 COMPLETE CBC W/AUTO DIFF WBC: CPT | Performed by: HOSPITALIST

## 2021-06-07 PROCEDURE — 99232 SBSQ HOSP IP/OBS MODERATE 35: CPT | Performed by: INTERNAL MEDICINE

## 2021-06-07 PROCEDURE — 94660 CPAP INITIATION&MGMT: CPT

## 2021-06-07 PROCEDURE — 85730 THROMBOPLASTIN TIME PARTIAL: CPT | Performed by: HOSPITALIST

## 2021-06-07 PROCEDURE — 94640 AIRWAY INHALATION TREATMENT: CPT

## 2021-06-07 PROCEDURE — 84132 ASSAY OF SERUM POTASSIUM: CPT | Performed by: FAMILY MEDICINE

## 2021-06-07 RX ORDER — MIDODRINE HYDROCHLORIDE 5 MG/1
15 TABLET ORAL
Qty: 270 TABLET | Refills: 0 | Status: SHIPPED | OUTPATIENT
Start: 2021-06-07

## 2021-06-07 RX ORDER — POTASSIUM CHLORIDE 14.9 MG/ML
20 INJECTION INTRAVENOUS
Status: COMPLETED | OUTPATIENT
Start: 2021-06-07 | End: 2021-06-07

## 2021-06-07 RX ADMIN — ACETAMINOPHEN 650 MG: 325 TABLET, FILM COATED ORAL at 00:31

## 2021-06-07 RX ADMIN — IPRATROPIUM BROMIDE 0.5 MG: 0.5 SOLUTION RESPIRATORY (INHALATION) at 13:19

## 2021-06-07 RX ADMIN — MIDODRINE HYDROCHLORIDE 15 MG: 5 TABLET ORAL at 17:34

## 2021-06-07 RX ADMIN — MIDODRINE HYDROCHLORIDE 15 MG: 5 TABLET ORAL at 12:14

## 2021-06-07 RX ADMIN — LEVALBUTEROL HYDROCHLORIDE 1.25 MG: 1.25 SOLUTION, CONCENTRATE RESPIRATORY (INHALATION) at 19:18

## 2021-06-07 RX ADMIN — IPRATROPIUM BROMIDE 0.5 MG: 0.5 SOLUTION RESPIRATORY (INHALATION) at 07:24

## 2021-06-07 RX ADMIN — POTASSIUM CHLORIDE 20 MEQ: 14.9 INJECTION, SOLUTION INTRAVENOUS at 14:16

## 2021-06-07 RX ADMIN — POTASSIUM CHLORIDE 20 MEQ: 14.9 INJECTION, SOLUTION INTRAVENOUS at 12:28

## 2021-06-07 RX ADMIN — LEVALBUTEROL HYDROCHLORIDE 1.25 MG: 1.25 SOLUTION, CONCENTRATE RESPIRATORY (INHALATION) at 13:19

## 2021-06-07 RX ADMIN — MIDODRINE HYDROCHLORIDE 15 MG: 5 TABLET ORAL at 05:17

## 2021-06-07 RX ADMIN — APIXABAN 10 MG: 5 TABLET, FILM COATED ORAL at 22:35

## 2021-06-07 RX ADMIN — APIXABAN 10 MG: 5 TABLET, FILM COATED ORAL at 14:11

## 2021-06-07 RX ADMIN — IPRATROPIUM BROMIDE 0.5 MG: 0.5 SOLUTION RESPIRATORY (INHALATION) at 19:18

## 2021-06-07 RX ADMIN — HEPARIN SODIUM 4400 UNITS: 1000 INJECTION INTRAVENOUS; SUBCUTANEOUS at 12:46

## 2021-06-07 RX ADMIN — LEVALBUTEROL HYDROCHLORIDE 1.25 MG: 1.25 SOLUTION, CONCENTRATE RESPIRATORY (INHALATION) at 07:23

## 2021-06-07 NOTE — PROGRESS NOTES
[facility]  Progress Note - Doyal Gardena 1955, 77 y o  male MRN: [id number]  Unit/Bed#: E5 -01 Encounter: [id number]  Primary Care Provider: Shanita Phillips MD   Date and time admitted to hospital: 6/2/2021  3:12 PM    Hypokalemia  Assessment & Plan  · Will replace now    Single subsegmental pulmonary embolism without acute cor pulmonale (HCC)  Assessment & Plan  · CT scan performed in 6/4 revealed subsegmental pulmonary embolism in right lower lobe  · Continue heparin drip   · Continue to monitor  · Will transition to Eliquis 10 b i d  x7 days followed by 5 BID when clinically appropriate     Hypotension  Assessment & Plan  · Patient has remained hypotensive throughout admission  · Improved today  · Midodrine 15mg TID  ·     Severe protein-calorie malnutrition ([address] )  Assessment & Plan  Malnutrition Findings:   Adult Malnutrition type: Chronic illness  Adult Degree of Malnutrition: Other severe protein calorie malnutrition    BMI Findings:  Adult BMI Classifications: Underweight < 18 5     Body mass index is 16 59 kg/m²  · In the setting of lymphoma, as evidenced by muscle wasting, fat loss, inadequate energy intake  · Requires nutritional supplements and dietitian consult    Hyponatremia  Assessment & Plan  · Mild likely due to underlying malignancy  · Monitor while inpatient    Anemia  Assessment & Plan  · Likely secondary to lymphoma  · He has no jaundice/elevated LFTs to suggest hemolysis  · He has no sign of bleeding  · Hemoglobin on admission 6 3    · Improved now with transfusions    Hodgkin lymphoma of intra-abdominal lymph nodes ([facility] [address] )  Assessment & Plan  · Stage IV B  · Initial diagnosis in 2018, initially denied treatment secondary to Faith beliefs  · Known to hematologist/oncologist Dr Chloe Garcia, at [facility]  · According to his wife and [facility] records he will need immunotherapy , however this has not been started due to insurance issues  · Patient refused chemotherapy in the past, was previously treated with Nivolumab  · Patient was last evaluated by Dr Jenniffer Rodriguez on 5/4 and agreed to restarting Nivolumab every 2 weeks, is scheduled to restart on 06/08  · CT Chest, abdomen , pelvis with contrast--> evidence multifocal malignancy in the chest, abdomen, and pelvis involving lung parenchyma, lymph nodes, liver, and spleen  Subsegmental right lower lobe PE  Small pleural effusions  · Heme onc follows    * Systemic inflammatory response syndrome (SIRS) due to noninfectious process [facility])  Assessment & Plan  · Likely SIRS from B symptoms due to lymphoma  · However, noted to have elevated lactic acid together with fever and tachycardia on admission  · Infectious disease suspects this is likely secondary to stage IV Hodgkin lymphoma with metastasis-discontinue antibiotics yesterday  · Patient admitted to [facility] approximately a week and half ago for similar symptoms-plan for outpatient follow-up on Tuesday if discharged prior to then  · CT Chest, abdomen , pelvis with contrast--> evidence multifocal malignancy in the chest, abdomen, and pelvis involving lung parenchyma, lymph nodes, liver, and spleen  Subsegmental right lower lobe PE  Small pleural effusions  · Seen in consult by Hematology-Oncology        VTE Pharmacologic Prophylaxis:   Pharmacologic: Apixaban (Eliquis)  Mechanical VTE Prophylaxis in Place: Yes    Patient Centered Rounds: I have performed bedside rounds with nursing staff today  Discussions with Specialists or Other Care Team Provider: nursing and ID    Education and Discussions with Family / Patient: patient's wife    Time Spent for Care: 20 minutes  More than 50% of total time spent on counseling and coordination of care as described above      Current Length of Stay: 5 day(s)    Current Patient Status: Inpatient   Certification Statement: The patient will continue to require additional inpatient hospital stay due to tachycardia and hypoxia    Discharge Plan: 24 hrs    Code Status: Level 1 - Full Code      Subjective:   Patient was seen and examined  He is eating lunch and his appetite is improved  His wife at the bedside  Objective:     Vitals:   Temp (24hrs), Av °F (37 2 °C), Min:97 9 °F (36 6 °C), Max:101 3 °F (38 5 °C)    Temp:  [97 9 °F (36 6 °C)-101 3 °F (38 5 °C)] 100 1 °F (37 8 °C)  HR:  [] 108  Resp:  [16-20] 18  BP: ()/(59-71) 99/66  SpO2:  [94 %-99 %] 95 %  Body mass index is 16 59 kg/m²  Input and Output Summary (last 24 hours): Intake/Output Summary (Last 24 hours) at 2021 1828  Last data filed at 2021 2238  Gross per 24 hour   Intake 700 ml   Output 1150 ml   Net -450 ml       Physical Exam:     Physical Exam  Constitutional:       Appearance: He is underweight  He is ill-appearing  Cardiovascular:      Rate and Rhythm: Regular rhythm  Tachycardia present  Heart sounds: Normal heart sounds  Pulmonary:      Effort: Pulmonary effort is normal       Breath sounds: Normal breath sounds  Abdominal:      Palpations: Abdomen is soft  Tenderness: There is no abdominal tenderness  Skin:     General: Skin is warm  Findings: No erythema or rash  Neurological:      Mental Status: He is alert             Additional Data:     Labs:    Results from last 7 days   Lab Units 21  0353   WBC Thousand/uL 5 55   HEMOGLOBIN g/dL 8 1*   HEMATOCRIT % 26 0*   PLATELETS Thousands/uL 277   NEUTROS PCT % 89*   LYMPHS PCT % 3*   MONOS PCT % 4   EOS PCT % 2     Results from last 7 days   Lab Units 21  1720 21  0353  21  0501   SODIUM mmol/L  --  134*   < > 135*   POTASSIUM mmol/L 4 2 2 9*   < > 3 8   CHLORIDE mmol/L  --  93*   < > 95*   CO2 mmol/L  --  34*   < > 30   BUN mg/dL  --  15   < > 14   CREATININE mg/dL  --  0 50*   < > 0 58*   ANION GAP mmol/L  --  7   < > 10   CALCIUM mg/dL  --  8 1*   < > 8 7   ALBUMIN g/dL  --   --   --  1 4*   TOTAL BILIRUBIN mg/dL  --   --   --  0 61   ALK PHOS U/L  --   --   --  151*   ALT U/L  --   --   --  34   AST U/L  --   --   --  23   GLUCOSE RANDOM mg/dL  --  121   < > 91    < > = values in this interval not displayed  Results from last 7 days   Lab Units 06/04/21  1959   INR  1 79*             Results from last 7 days   Lab Units 06/05/21  0534 06/04/21  0501 06/03/21  0633 06/02/21  1724 06/02/21  1527   LACTIC ACID mmol/L  --   --   --  1 6 3 4*   PROCALCITONIN ng/ml 25 75* 29 79* 8 38*  --  0 63*           * I Have Reviewed All Lab Data Listed Above  * Additional Pertinent Lab Tests Reviewed: All Labs Within Last 24 Hours Reviewed    Imaging:    Recent Cultures (last 7 days):     Results from last 7 days   Lab Units 06/02/21  1527   BLOOD CULTURE  No Growth After 4 Days  No Growth After 4 Days  Last 24 Hours Medication List:   Current Facility-Administered Medications   Medication Dose Route Frequency Provider Last Rate    acetaminophen  650 mg Oral Q6H PRN Dov Rivera MD      apixaban  10 mg Oral BID Radha Wiseman MD      Followed by   Selene Montes ON 6/14/2021] apixaban  5 mg Oral BID Radha Wiseman MD      ipratropium  0 5 mg Nebulization TID Caitlin Parr PA-C      levalbuterol  1 25 mg Nebulization TID Caitlin Parr PA-C      midodrine  15 mg Oral TID AC Pily Alicea PA-C          Today, Patient Was Seen By: Radha Wiseman MD    ** Please Note: Dictation voice to text software may have been used in the creation of this document   **

## 2021-06-07 NOTE — ASSESSMENT & PLAN NOTE
· Likely SIRS from B symptoms due to lymphoma  · However, noted to have elevated lactic acid together with fever and tachycardia on admission  · Infectious disease suspects this is likely secondary to stage IV Hodgkin lymphoma with metastasis-discontinue antibiotics yesterday  · Patient admitted to [facility] approximately a week and half ago for similar symptoms-plan for outpatient follow-up on Tuesday if discharged prior to then  · CT Chest, abdomen , pelvis with contrast--> evidence multifocal malignancy in the chest, abdomen, and pelvis involving lung parenchyma, lymph nodes, liver, and spleen  Subsegmental right lower lobe PE    Small pleural effusions  · Seen in consult by Hematology-Oncology

## 2021-06-07 NOTE — ASSESSMENT & PLAN NOTE
· Likely secondary to lymphoma  · He has no jaundice/elevated LFTs to suggest hemolysis  · He has no sign of bleeding  · Hemoglobin on admission 6 3    · Improved now with transfusions

## 2021-06-07 NOTE — ASSESSMENT & PLAN NOTE
· Stage IV B  · Initial diagnosis in 2018, initially denied treatment secondary to Sabianist beliefs  · Known to hematologist/oncologist Dr Nir Wray, at [facility]  · According to his wife and [facility] records he will need immunotherapy , however this has not been started due to insurance issues  · Patient refused chemotherapy in the past, was previously treated with Nivolumab  · Patient was last evaluated by Dr Nir Wray on 5/4 and agreed to restarting Nivolumab every 2 weeks, is scheduled to restart on 06/08  · CT Chest, abdomen , pelvis with contrast--> evidence multifocal malignancy in the chest, abdomen, and pelvis involving lung parenchyma, lymph nodes, liver, and spleen  Subsegmental right lower lobe PE    Small pleural effusions  · Heme onc follows

## 2021-06-08 PROBLEM — R00.0 SINUS TACHYCARDIA: Status: ACTIVE | Noted: 2021-06-08

## 2021-06-08 PROBLEM — J96.00 ACUTE RESPIRATORY FAILURE (HCC): Status: ACTIVE | Noted: 2021-06-08

## 2021-06-08 LAB
ANION GAP SERPL CALCULATED.3IONS-SCNC: 2 MMOL/L (ref 4–13)
ANION GAP SERPL CALCULATED.3IONS-SCNC: 5 MMOL/L (ref 4–13)
APTT PPP: >210 SECONDS (ref 23–37)
ARTERIAL PATENCY WRIST A: YES
ATRIAL RATE: 102 BPM
ATRIAL RATE: 104 BPM
ATRIAL RATE: 137 BPM
BASE EXCESS BLDA CALC-SCNC: 12 MMOL/L
BASOPHILS # BLD AUTO: 0.01 THOUSANDS/ΜL (ref 0–0.1)
BASOPHILS NFR BLD AUTO: 0 % (ref 0–1)
BUN SERPL-MCNC: 12 MG/DL (ref 5–25)
BUN SERPL-MCNC: 13 MG/DL (ref 5–25)
CALCIUM SERPL-MCNC: 8.3 MG/DL (ref 8.3–10.1)
CALCIUM SERPL-MCNC: 8.5 MG/DL (ref 8.3–10.1)
CHLORIDE SERPL-SCNC: 92 MMOL/L (ref 100–108)
CHLORIDE SERPL-SCNC: 93 MMOL/L (ref 100–108)
CO2 SERPL-SCNC: 35 MMOL/L (ref 21–32)
CO2 SERPL-SCNC: 35 MMOL/L (ref 21–32)
CREAT SERPL-MCNC: 0.48 MG/DL (ref 0.6–1.3)
CREAT SERPL-MCNC: 0.52 MG/DL (ref 0.6–1.3)
EOSINOPHIL # BLD AUTO: 0.1 THOUSAND/ΜL (ref 0–0.61)
EOSINOPHIL NFR BLD AUTO: 2 % (ref 0–6)
ERYTHROCYTE [DISTWIDTH] IN BLOOD BY AUTOMATED COUNT: 21.4 % (ref 11.6–15.1)
GFR SERPL CREATININE-BSD FRML MDRD: 111 ML/MIN/1.73SQ M
GFR SERPL CREATININE-BSD FRML MDRD: 115 ML/MIN/1.73SQ M
GLUCOSE SERPL-MCNC: 102 MG/DL (ref 65–140)
GLUCOSE SERPL-MCNC: 99 MG/DL (ref 65–140)
HCO3 BLDA-SCNC: 36.2 MMOL/L (ref 22–28)
HCT VFR BLD AUTO: 27.9 % (ref 36.5–49.3)
HGB BLD-MCNC: 8.7 G/DL (ref 12–17)
IMM GRANULOCYTES # BLD AUTO: 0.07 THOUSAND/UL (ref 0–0.2)
IMM GRANULOCYTES NFR BLD AUTO: 1 % (ref 0–2)
LYMPHOCYTES # BLD AUTO: 0.24 THOUSANDS/ΜL (ref 0.6–4.47)
LYMPHOCYTES NFR BLD AUTO: 4 % (ref 14–44)
MCH RBC QN AUTO: 23.1 PG (ref 26.8–34.3)
MCHC RBC AUTO-ENTMCNC: 31.2 G/DL (ref 31.4–37.4)
MCV RBC AUTO: 74 FL (ref 82–98)
MONOCYTES # BLD AUTO: 0.28 THOUSAND/ΜL (ref 0.17–1.22)
MONOCYTES NFR BLD AUTO: 4 % (ref 4–12)
NASAL CANNULA: 6
NEUTROPHILS # BLD AUTO: 5.83 THOUSANDS/ΜL (ref 1.85–7.62)
NEUTS SEG NFR BLD AUTO: 89 % (ref 43–75)
NRBC BLD AUTO-RTO: 0 /100 WBCS
O2 CT BLDA-SCNC: 14.1 ML/DL (ref 16–23)
OXYHGB MFR BLDA: 98.4 % (ref 94–97)
P AXIS: 53 DEGREES
P AXIS: 54 DEGREES
P AXIS: 70 DEGREES
PCO2 BLDA: 45.9 MM HG (ref 36–44)
PH BLDA: 7.51 [PH] (ref 7.35–7.45)
PLATELET # BLD AUTO: 269 THOUSANDS/UL (ref 149–390)
PMV BLD AUTO: 9.9 FL (ref 8.9–12.7)
PO2 BLDA: 129.3 MM HG (ref 75–129)
POTASSIUM SERPL-SCNC: 3 MMOL/L (ref 3.5–5.3)
POTASSIUM SERPL-SCNC: 4.3 MMOL/L (ref 3.5–5.3)
PR INTERVAL: 126 MS
PR INTERVAL: 146 MS
PR INTERVAL: 148 MS
QRS AXIS: -13 DEGREES
QRS AXIS: -13 DEGREES
QRS AXIS: -16 DEGREES
QRSD INTERVAL: 86 MS
QRSD INTERVAL: 92 MS
QRSD INTERVAL: 94 MS
QT INTERVAL: 374 MS
QT INTERVAL: 396 MS
QT INTERVAL: 396 MS
QTC INTERVAL: 516 MS
QTC INTERVAL: 520 MS
QTC INTERVAL: 564 MS
RBC # BLD AUTO: 3.77 MILLION/UL (ref 3.88–5.62)
SODIUM SERPL-SCNC: 130 MMOL/L (ref 136–145)
SODIUM SERPL-SCNC: 132 MMOL/L (ref 136–145)
SPECIMEN SOURCE: ABNORMAL
T WAVE AXIS: -82 DEGREES
T WAVE AXIS: -85 DEGREES
T WAVE AXIS: 110 DEGREES
TROPONIN I SERPL-MCNC: 0.09 NG/ML
TROPONIN I SERPL-MCNC: 0.12 NG/ML
VENTRICULAR RATE: 102 BPM
VENTRICULAR RATE: 104 BPM
VENTRICULAR RATE: 137 BPM
WBC # BLD AUTO: 6.53 THOUSAND/UL (ref 4.31–10.16)

## 2021-06-08 PROCEDURE — 99232 SBSQ HOSP IP/OBS MODERATE 35: CPT | Performed by: FAMILY MEDICINE

## 2021-06-08 PROCEDURE — 93010 ELECTROCARDIOGRAM REPORT: CPT | Performed by: INTERNAL MEDICINE

## 2021-06-08 PROCEDURE — 93005 ELECTROCARDIOGRAM TRACING: CPT

## 2021-06-08 PROCEDURE — 84484 ASSAY OF TROPONIN QUANT: CPT | Performed by: FAMILY MEDICINE

## 2021-06-08 PROCEDURE — 85730 THROMBOPLASTIN TIME PARTIAL: CPT | Performed by: FAMILY MEDICINE

## 2021-06-08 PROCEDURE — 99232 SBSQ HOSP IP/OBS MODERATE 35: CPT | Performed by: INTERNAL MEDICINE

## 2021-06-08 PROCEDURE — 80048 BASIC METABOLIC PNL TOTAL CA: CPT | Performed by: FAMILY MEDICINE

## 2021-06-08 PROCEDURE — 82805 BLOOD GASES W/O2 SATURATION: CPT | Performed by: FAMILY MEDICINE

## 2021-06-08 PROCEDURE — 97167 OT EVAL HIGH COMPLEX 60 MIN: CPT

## 2021-06-08 PROCEDURE — 36600 WITHDRAWAL OF ARTERIAL BLOOD: CPT

## 2021-06-08 PROCEDURE — 94640 AIRWAY INHALATION TREATMENT: CPT

## 2021-06-08 PROCEDURE — 97163 PT EVAL HIGH COMPLEX 45 MIN: CPT | Performed by: PHYSICAL THERAPIST

## 2021-06-08 PROCEDURE — 85025 COMPLETE CBC W/AUTO DIFF WBC: CPT | Performed by: FAMILY MEDICINE

## 2021-06-08 PROCEDURE — 94760 N-INVAS EAR/PLS OXIMETRY 1: CPT

## 2021-06-08 RX ORDER — POTASSIUM CHLORIDE 20 MEQ/1
40 TABLET, EXTENDED RELEASE ORAL ONCE
Status: COMPLETED | OUTPATIENT
Start: 2021-06-08 | End: 2021-06-08

## 2021-06-08 RX ORDER — FUROSEMIDE 20 MG/1
20 TABLET ORAL 2 TIMES DAILY
Qty: 60 TABLET | Refills: 0 | Status: CANCELLED | OUTPATIENT
Start: 2021-06-08

## 2021-06-08 RX ORDER — FUROSEMIDE 10 MG/ML
20 INJECTION INTRAMUSCULAR; INTRAVENOUS ONCE
Status: COMPLETED | OUTPATIENT
Start: 2021-06-08 | End: 2021-06-08

## 2021-06-08 RX ADMIN — LEVALBUTEROL HYDROCHLORIDE 1.25 MG: 1.25 SOLUTION, CONCENTRATE RESPIRATORY (INHALATION) at 20:01

## 2021-06-08 RX ADMIN — IPRATROPIUM BROMIDE 0.5 MG: 0.5 SOLUTION RESPIRATORY (INHALATION) at 13:17

## 2021-06-08 RX ADMIN — IPRATROPIUM BROMIDE 0.5 MG: 0.5 SOLUTION RESPIRATORY (INHALATION) at 20:01

## 2021-06-08 RX ADMIN — MIDODRINE HYDROCHLORIDE 15 MG: 5 TABLET ORAL at 16:28

## 2021-06-08 RX ADMIN — FUROSEMIDE 20 MG: 10 INJECTION, SOLUTION INTRAVENOUS at 12:37

## 2021-06-08 RX ADMIN — MIDODRINE HYDROCHLORIDE 15 MG: 5 TABLET ORAL at 05:54

## 2021-06-08 RX ADMIN — APIXABAN 10 MG: 5 TABLET, FILM COATED ORAL at 22:28

## 2021-06-08 RX ADMIN — POTASSIUM CHLORIDE 40 MEQ: 1500 TABLET, EXTENDED RELEASE ORAL at 09:22

## 2021-06-08 RX ADMIN — LEVALBUTEROL HYDROCHLORIDE 1.25 MG: 1.25 SOLUTION, CONCENTRATE RESPIRATORY (INHALATION) at 07:35

## 2021-06-08 RX ADMIN — APIXABAN 10 MG: 5 TABLET, FILM COATED ORAL at 09:22

## 2021-06-08 RX ADMIN — MIDODRINE HYDROCHLORIDE 15 MG: 5 TABLET ORAL at 11:40

## 2021-06-08 RX ADMIN — ACETAMINOPHEN 650 MG: 325 TABLET, FILM COATED ORAL at 23:43

## 2021-06-08 RX ADMIN — POTASSIUM CHLORIDE 40 MEQ: 1500 TABLET, EXTENDED RELEASE ORAL at 11:41

## 2021-06-08 RX ADMIN — IPRATROPIUM BROMIDE 0.5 MG: 0.5 SOLUTION RESPIRATORY (INHALATION) at 07:35

## 2021-06-08 RX ADMIN — LEVALBUTEROL HYDROCHLORIDE 1.25 MG: 1.25 SOLUTION, CONCENTRATE RESPIRATORY (INHALATION) at 13:17

## 2021-06-08 NOTE — ASSESSMENT & PLAN NOTE
Patient developed respiratory failure with oxygen requirement for 4 L  Chest x-ray reveals findings suggestive of fluid overload and heart failure  2D echo ordered and pending

## 2021-06-08 NOTE — PLAN OF CARE
Problem: OCCUPATIONAL THERAPY ADULT  Goal: Performs self-care activities at highest level of function for planned discharge setting  See evaluation for individualized goals  Description: Treatment Interventions: ADL retraining, Functional transfer training, UE strengthening/ROM, Endurance training, Patient/family training, Equipment evaluation/education, Compensatory technique education, Energy conservation, Activityengagement, Cognitive reorientation  Equipment Recommended: Bedside commode(RW )       See flowsheet documentation for full assessment, interventions and recommendations  Note: Limitation: Decreased ADL status, Decreased UE ROM, Decreased UE strength, Decreased Safe judgement during ADL, Decreased endurance, Decreased fine motor control, Decreased self-care trans, Decreased high-level ADLs, Decreased cognition  Prognosis: Fair  Assessment: Pt is a 77 y o  male  seen for OT evaluation after admission to [facility] 6/2/2021 for fever, chills and cough  Comorbidities impacting pt's occupational outcomes include hodgkin lymphoma of intra-abdominal lymph nodes (tx at [facility] since 2018), systemic inflammatory response syndrome due to non infectious process, anemia, hypotension, single subsegmental PE of R lower lobe without acute carpulmonale, severe protein-calorie malnutrition, and hypokalemia  Pt has active OT orders and activity as tolerated orders  OT was consulted to assess pt's functional status and occupational performance in order to determine discharge needs  Contexts influencing pts occupational performance include living in a single story home with the continuous support of his wife and limited financial resources (per wife)  Pt and wife Jordanian speaking only  PTA, pt was MIN A  assist in ADLs, IADLs, & functional mobility, and without equipment  At time of evaluation pt's wife was present and [employer] interpretation services were used    Pt required the following level of assistpt SBA eating, MIN A  grooming, MOD A UB ADLs, MAX A LB ADLs, MAX A toileting, MIN A  functional mobility, MIN A  functional transfers  Pt required VCs for safety and techniques w/ RW  Deficits impacting pts occupational performance includeweakness, impaired balance, decreased endurance, increased fall risk, decreased ADLS, decreased IADLS, decreased activity tolerance, decreased safety awareness decreased strength, and increased processing time  Pt would benefit from continued OT tx 3-5x/wk to promote safety and address immediate occupational deficits in order to return to prior level of functioning  When medically stable, OT recommended discharge to home w/ home OT & 24/7 family supervision/support vs post acute rehabilitation if family unable to provide increased support/assistance  The patient's raw score on the AM-PAC Daily Activity inpatient short form is 13, standardized score is 32 03, less than 39 4  Patients at this level are likely to benefit from discharge to post-acute rehabilitation services  Please refer to the recommendation of the Occupational Therapist for safe discharge planning          OT - OK to Discharge: Yes(when medically stable )

## 2021-06-08 NOTE — PLAN OF CARE
Problem: Potential for Falls  Goal: Patient will remain free of falls  Description: INTERVENTIONS:  - Assess patient frequently for physical needs  -  Identify cognitive and physical deficits and behaviors that affect risk of falls    -  [place] fall precautions as indicated by assessment   - Educate patient/family on patient safety including physical limitations  - Instruct patient to call for assistance with activity based on assessment  - Modify environment to reduce risk of injury  - Consider OT/PT consult to assist with strengthening/mobility  Outcome: Progressing     Problem: PAIN - ADULT  Goal: Verbalizes/displays adequate comfort level or baseline comfort level  Description: Interventions:  - Encourage patient to monitor pain and request assistance  - Assess pain using appropriate pain scale  - Administer analgesics based on type and severity of pain and evaluate response  - Implement non-pharmacological measures as appropriate and evaluate response  - Consider cultural and social influences on pain and pain management  - Notify physician/advanced practitioner if interventions unsuccessful or patient reports new pain  Outcome: Progressing     Problem: INFECTION - ADULT  Goal: Absence or prevention of progression during hospitalization  Description: INTERVENTIONS:  - Assess and monitor for signs and symptoms of infection  - Monitor lab/diagnostic results  - Monitor all insertion sites, i e  indwelling lines, tubes, and drains  - Monitor endotracheal if appropriate and nasal secretions for changes in amount and color  - [place] appropriate cooling/warming therapies per order  - Administer medications as ordered  - Instruct and encourage patient and family to use good hand hygiene technique  - Identify and instruct in appropriate isolation precautions for identified infection/condition  Outcome: Progressing  Goal: Absence of fever/infection during neutropenic period  Description: INTERVENTIONS:  - Monitor WBC    Outcome: Progressing     Problem: SAFETY ADULT  Goal: Patient will remain free of falls  Description: INTERVENTIONS:  - Assess patient frequently for physical needs  -  Identify cognitive and physical deficits and behaviors that affect risk of falls    -  [place] fall precautions as indicated by assessment   - Educate patient/family on patient safety including physical limitations  - Instruct patient to call for assistance with activity based on assessment  - Modify environment to reduce risk of injury  - Consider OT/PT consult to assist with strengthening/mobility  Outcome: Progressing  Goal: Maintain or return to baseline ADL function  Description: INTERVENTIONS:  -  Assess patient's ability to carry out ADLs; assess patient's baseline for ADL function and identify physical deficits which impact ability to perform ADLs (bathing, care of mouth/teeth, toileting, grooming, dressing, etc )  - Assess/evaluate cause of self-care deficits   - Assess range of motion  - Assess patient's mobility; develop plan if impaired  - Assess patient's need for assistive devices and provide as appropriate  - Encourage maximum independence but intervene and supervise when necessary  - Involve family in performance of ADLs  - Assess for home care needs following discharge   - Consider OT consult to assist with ADL evaluation and planning for discharge  - Provide patient education as appropriate  Outcome: Progressing  Goal: Maintain or return mobility status to optimal level  Description: INTERVENTIONS:  - Assess patient's baseline mobility status (ambulation, transfers, stairs, etc )    - Identify cognitive and physical deficits and behaviors that affect mobility  - Identify mobility aids required to assist with transfers and/or ambulation (gait belt, sit-to-stand, lift, walker, cane, etc )  - [place] fall precautions as indicated by assessment  - Record patient progress and toleration of activity level on Mobility SBAR; progress patient to next Phase/Stage  - Instruct patient to call for assistance with activity based on assessment  - Consider rehabilitation consult to assist with strengthening/weightbearing, etc   Outcome: Progressing     Problem: DISCHARGE PLANNING  Goal: Discharge to home or other facility with appropriate resources  Description: INTERVENTIONS:  - Identify barriers to discharge w/patient and caregiver  - Arrange for needed discharge resources and transportation as appropriate  - Identify discharge learning needs (meds, wound care, etc )  - Arrange for interpretive services to assist at discharge as needed  - Refer to Case Management Department for coordinating discharge planning if the patient needs post-hospital services based on physician/advanced practitioner order or complex needs related to functional status, cognitive ability, or social support system  Outcome: Progressing     Problem: Knowledge Deficit  Goal: Patient/family/caregiver demonstrates understanding of disease process, treatment plan, medications, and discharge instructions  Description: Complete learning assessment and assess knowledge base    Interventions:  - Provide teaching at level of understanding  - Provide teaching via preferred learning methods  Outcome: Progressing     Problem: Prexisting or High Potential for Compromised Skin Integrity  Goal: Skin integrity is maintained or improved  Description: INTERVENTIONS:  - Identify patients at risk for skin breakdown  - Assess and monitor skin integrity  - Assess and monitor nutrition and hydration status  - Monitor labs   - Assess for incontinence   - Turn and reposition patient  - Assist with mobility/ambulation  - Relieve pressure over bony prominences  - Avoid friction and shearing  - Provide appropriate hygiene as needed including keeping skin clean and dry  - Evaluate need for skin moisturizer/barrier cream  - Collaborate with interdisciplinary team   - Patient/family teaching  - Consider wound care consult   Outcome: Progressing     Problem: Nutrition/Hydration-ADULT  Goal: Nutrient/Hydration intake appropriate for improving, restoring or maintaining nutritional needs  Description: Monitor and assess patient's nutrition/hydration status for malnutrition  Collaborate with interdisciplinary team and initiate plan and interventions as ordered  Monitor patient's weight and dietary intake as ordered or per policy  Utilize nutrition screening tool and intervene as necessary  Determine patient's food preferences and provide high-protein, high-caloric foods as appropriate       INTERVENTIONS:  - Monitor oral intake, urinary output, labs, and treatment plans  - Assess nutrition and hydration status and recommend course of action  - Evaluate amount of meals eaten  - Assist patient with eating if necessary   - Allow adequate time for meals  - Recommend/ encourage appropriate diets, oral nutritional supplements, and vitamin/mineral supplements  - Order, calculate, and assess calorie counts as needed  - Recommend, monitor, and adjust tube feedings and TPN/PPN based on assessed needs  - Assess need for intravenous fluids  - Provide specific nutrition/hydration education as appropriate  - Include patient/family/caregiver in decisions related to nutrition  Outcome: Progressing

## 2021-06-08 NOTE — PLAN OF CARE
Problem: Potential for Falls  Goal: Patient will remain free of falls  Description: INTERVENTIONS:  - Assess patient frequently for physical needs  -  Identify cognitive and physical deficits and behaviors that affect risk of falls    -  [place] fall precautions as indicated by assessment   - Educate patient/family on patient safety including physical limitations  - Instruct patient to call for assistance with activity based on assessment  - Modify environment to reduce risk of injury  - Consider OT/PT consult to assist with strengthening/mobility  Outcome: Progressing     Problem: PAIN - ADULT  Goal: Verbalizes/displays adequate comfort level or baseline comfort level  Description: Interventions:  - Encourage patient to monitor pain and request assistance  - Assess pain using appropriate pain scale  - Administer analgesics based on type and severity of pain and evaluate response  - Implement non-pharmacological measures as appropriate and evaluate response  - Consider cultural and social influences on pain and pain management  - Notify physician/advanced practitioner if interventions unsuccessful or patient reports new pain  Outcome: Progressing     Problem: INFECTION - ADULT  Goal: Absence or prevention of progression during hospitalization  Description: INTERVENTIONS:  - Assess and monitor for signs and symptoms of infection  - Monitor lab/diagnostic results  - Monitor all insertion sites, i e  indwelling lines, tubes, and drains  - Monitor endotracheal if appropriate and nasal secretions for changes in amount and color  - [place] appropriate cooling/warming therapies per order  - Administer medications as ordered  - Instruct and encourage patient and family to use good hand hygiene technique  - Identify and instruct in appropriate isolation precautions for identified infection/condition  Outcome: Progressing  Goal: Absence of fever/infection during neutropenic period  Description: INTERVENTIONS:  - Monitor WBC    Outcome: Progressing     Problem: SAFETY ADULT  Goal: Patient will remain free of falls  Description: INTERVENTIONS:  - Assess patient frequently for physical needs  -  Identify cognitive and physical deficits and behaviors that affect risk of falls    -  [place] fall precautions as indicated by assessment   - Educate patient/family on patient safety including physical limitations  - Instruct patient to call for assistance with activity based on assessment  - Modify environment to reduce risk of injury  - Consider OT/PT consult to assist with strengthening/mobility  Outcome: Progressing  Goal: Maintain or return to baseline ADL function  Description: INTERVENTIONS:  -  Assess patient's ability to carry out ADLs; assess patient's baseline for ADL function and identify physical deficits which impact ability to perform ADLs (bathing, care of mouth/teeth, toileting, grooming, dressing, etc )  - Assess/evaluate cause of self-care deficits   - Assess range of motion  - Assess patient's mobility; develop plan if impaired  - Assess patient's need for assistive devices and provide as appropriate  - Encourage maximum independence but intervene and supervise when necessary  - Involve family in performance of ADLs  - Assess for home care needs following discharge   - Consider OT consult to assist with ADL evaluation and planning for discharge  - Provide patient education as appropriate  Outcome: Progressing  Goal: Maintain or return mobility status to optimal level  Description: INTERVENTIONS:  - Assess patient's baseline mobility status (ambulation, transfers, stairs, etc )    - Identify cognitive and physical deficits and behaviors that affect mobility  - Identify mobility aids required to assist with transfers and/or ambulation (gait belt, sit-to-stand, lift, walker, cane, etc )  - [place] fall precautions as indicated by assessment  - Record patient progress and toleration of activity level on Mobility SBAR; progress patient to next Phase/Stage  - Instruct patient to call for assistance with activity based on assessment  - Consider rehabilitation consult to assist with strengthening/weightbearing, etc   Outcome: Progressing     Problem: DISCHARGE PLANNING  Goal: Discharge to home or other facility with appropriate resources  Description: INTERVENTIONS:  - Identify barriers to discharge w/patient and caregiver  - Arrange for needed discharge resources and transportation as appropriate  - Identify discharge learning needs (meds, wound care, etc )  - Arrange for interpretive services to assist at discharge as needed  - Refer to Case Management Department for coordinating discharge planning if the patient needs post-hospital services based on physician/advanced practitioner order or complex needs related to functional status, cognitive ability, or social support system  Outcome: Progressing     Problem: Knowledge Deficit  Goal: Patient/family/caregiver demonstrates understanding of disease process, treatment plan, medications, and discharge instructions  Description: Complete learning assessment and assess knowledge base    Interventions:  - Provide teaching at level of understanding  - Provide teaching via preferred learning methods  Outcome: Progressing     Problem: Prexisting or High Potential for Compromised Skin Integrity  Goal: Skin integrity is maintained or improved  Description: INTERVENTIONS:  - Identify patients at risk for skin breakdown  - Assess and monitor skin integrity  - Assess and monitor nutrition and hydration status  - Monitor labs   - Assess for incontinence   - Turn and reposition patient  - Assist with mobility/ambulation  - Relieve pressure over bony prominences  - Avoid friction and shearing  - Provide appropriate hygiene as needed including keeping skin clean and dry  - Evaluate need for skin moisturizer/barrier cream  - Collaborate with interdisciplinary team   - Patient/family teaching  - Consider wound care consult   Outcome: Progressing     Problem: Nutrition/Hydration-ADULT  Goal: Nutrient/Hydration intake appropriate for improving, restoring or maintaining nutritional needs  Description: Monitor and assess patient's nutrition/hydration status for malnutrition  Collaborate with interdisciplinary team and initiate plan and interventions as ordered  Monitor patient's weight and dietary intake as ordered or per policy  Utilize nutrition screening tool and intervene as necessary  Determine patient's food preferences and provide high-protein, high-caloric foods as appropriate       INTERVENTIONS:  - Monitor oral intake, urinary output, labs, and treatment plans  - Assess nutrition and hydration status and recommend course of action  - Evaluate amount of meals eaten  - Assist patient with eating if necessary   - Allow adequate time for meals  - Recommend/ encourage appropriate diets, oral nutritional supplements, and vitamin/mineral supplements  - Order, calculate, and assess calorie counts as needed  - Recommend, monitor, and adjust tube feedings and TPN/PPN based on assessed needs  - Assess need for intravenous fluids  - Provide specific nutrition/hydration education as appropriate  - Include patient/family/caregiver in decisions related to nutrition  Outcome: Progressing

## 2021-06-08 NOTE — PLAN OF CARE
Problem: PHYSICAL THERAPY ADULT  Goal: Performs mobility at highest level of function for planned discharge setting  See evaluation for individualized goals  Description: Treatment/Interventions: Functional transfer training, LE strengthening/ROM, Therapeutic exercise, Endurance training, Cognitive reorientation, Patient/family training, Equipment eval/education, Bed mobility, Gait training, Spoke to nursing, OT  Equipment Recommended: Walker(bsc)       See flowsheet documentation for full assessment, interventions and recommendations  Note: Prognosis: Good  Problem List: Decreased strength, Decreased endurance, Impaired balance, Decreased mobility, Impaired judgement, Decreased safety awareness  Assessment: pt admitted wtih cough, fever and chills  dx wtih uti , sirs, fever, anemia, spesis and hyponatremia  pt referred to PT  pt was needing assist for set up and some mobility PTA, wife has been caring for him    pt has had 1 fall  pt lives in single floor home, 1 stair to enter  pt demonstrated moderate functional limitations due to chronic illness (lymphoma) , current illness and chronic debility  pt was able to amb 10' using min assist arm in arm  noted to have [place] on short distance  pt has current deficits in strength, balance, gait stability and sequencing, pulmonary function and activity tolearnce  pt desires to  return home and wife agrees  pt can return home, recommend rw, bsc, home PT  Barriers to Discharge: None        PT Discharge Recommendation: Home with home health rehabilitation     PT - OK to Discharge: Yes    See flowsheet documentation for full assessment

## 2021-06-08 NOTE — PROGRESS NOTES
[address]  Progress Note - Trent Shaisaías 1955, 77 y o  male MRN: [id number]  Unit/Bed#: E5 -01 Encounter: [id number]  Primary Care Provider: Álvaro Paredes MD   Date and time admitted to hospital: 6/2/2021  3:12 PM    Sinus tachycardia  Assessment & Plan  Up to 130s today  Likely reactive and related to overall malignancy processes, fever, difficulty breathing  Will continue to monitor  2D echo ordered  Acute respiratory failure [facility])  Assessment & Plan  Patient developed respiratory failure with oxygen requirement for 4 L  Chest x-ray reveals findings suggestive of fluid overload and heart failure  2D echo ordered and pending  Hypokalemia  Assessment & Plan  · Will continue to replace  · Potassium is still low at 3 0 this morning    Single subsegmental pulmonary embolism without acute cor pulmonale (HCC)  Assessment & Plan  · CT scan performed in 6/4 revealed subsegmental pulmonary embolism in right lower lobe  · Continue heparin drip   · Continue to monitor  · Will transition to Eliquis 10 b i d  x7 days followed by 5 BID when clinically appropriate     Hypotension  Assessment & Plan  · Patient has remained hypotensive throughout admission  · Improved today  · Midodrine 15mg TID  · Blood pressures are currently improved    Severe protein-calorie malnutrition ([address] )  Assessment & Plan  Malnutrition Findings:   Adult Malnutrition type: Chronic illness  Adult Degree of Malnutrition: Other severe protein calorie malnutrition    BMI Findings:  Adult BMI Classifications: Underweight < 18 5     Body mass index is 16 59 kg/m²  · In the setting of lymphoma, as evidenced by muscle wasting, fat loss, inadequate energy intake  · Requires nutritional supplements and dietitian consult    Hyponatremia  Assessment & Plan  · Mild likely due to underlying malignancy  · Monitor while inpatient    Anemia  Assessment & Plan  · Likely secondary to lymphoma    · He has no jaundice/elevated LFTs to suggest hemolysis  · He has no sign of bleeding  · Hemoglobin on admission 6 3  · Improved now with transfusions    Hodgkin lymphoma of intra-abdominal lymph nodes ([facility] [address] )  Assessment & Plan  · Stage IV B  · Initial diagnosis in 2018, initially denied treatment secondary to [employer] beliefs  · Known to hematologist/oncologist Dr Guanako Rogers, at [facility]  · According to his wife and [facility] records he will need immunotherapy , however this has not been started due to insurance issues  · Patient refused chemotherapy in the past, was previously treated with Nivolumab  · Patient was last evaluated by Dr Guanako Rogers on 5/4 and agreed to restarting Nivolumab every 2 weeks, is scheduled to restart on 06/08  · CT Chest, abdomen , pelvis with contrast--> evidence multifocal malignancy in the chest, abdomen, and pelvis involving lung parenchyma, lymph nodes, liver, and spleen  Subsegmental right lower lobe PE  Small pleural effusions  · Heme onc follows    * Systemic inflammatory response syndrome (SIRS) due to noninfectious process [facility])  Assessment & Plan  · Likely SIRS from B symptoms due to lymphoma  · However, noted to have elevated lactic acid together with fever and tachycardia on admission  · Infectious disease suspects this is likely secondary to stage IV Hodgkin lymphoma with metastasis-discontinued antibiotics   · Patient admitted to [facility] approximately a week and half ago for similar symptoms-plan for outpatient follow-up on Tuesday if discharged prior to then  Unfortunately, patient with respiratory failure was not able to be discharged to make the appointment  · CT Chest, abdomen , pelvis with contrast--> evidence multifocal malignancy in the chest, abdomen, and pelvis involving lung parenchyma, lymph nodes, liver, and spleen  Subsegmental right lower lobe PE    Small pleural effusions  · Seen in consult by Hematology-Oncology      VTE Pharmacologic Prophylaxis: Pharmacologic: Apixaban (Eliquis)  Mechanical VTE Prophylaxis in Place: Yes    Patient Centered Rounds: I have performed bedside rounds with nursing staff today  Discussions with Specialists or Other Care Team Provider: Nursing and CM    Education and Discussions with Family / Patient: patient's wife and niece    Time Spent for Care: 45 min  More than 50% of total time spent on counseling and coordination of care as described above  Current Length of Stay: 6 day(s)    Current Patient Status: Inpatient   Certification Statement: The patient will continue to require additional inpatient hospital stay due to Acute respiratory failure due to fluid overload    Discharge Plan:  48 hours    Code Status: Level 1 - Full Code      Subjective:   Patient was seen and examined  He is having difficulty breathing and is nonverbal today  Objective:     Vitals:   Temp (24hrs), Av 2 °F (37 3 °C), Min:98 2 °F (36 8 °C), Max:100 2 °F (37 9 °C)    Temp:  [98 2 °F (36 8 °C)-100 2 °F (37 9 °C)] 98 2 °F (36 8 °C)  HR:  [] 114  Resp:  [18] 18  BP: ()/(59-88) 92/59  SpO2:  [97 %-98 %] 98 %  Body mass index is 16 59 kg/m²  Input and Output Summary (last 24 hours):     No intake or output data in the 24 hours ending 21 1630    Physical Exam:     Physical Exam  Constitutional:       General: He is sleeping  Appearance: He is underweight  He is ill-appearing  HENT:      Head: Normocephalic and atraumatic  Cardiovascular:      Rate and Rhythm: Tachycardia present  Heart sounds: Normal heart sounds  Pulmonary:      Effort: Respiratory distress present  Breath sounds: Decreased breath sounds present  Comments: Increased respiratory effort  Abdominal:      Palpations: Abdomen is soft  Skin:     General: Skin is warm  Findings: No erythema or rash           Additional Data:     Labs:    Results from last 7 days   Lab Units 21  0353   WBC Thousand/uL 5 55   HEMOGLOBIN g/dL 8 1*   HEMATOCRIT % 26 0*   PLATELETS Thousands/uL 277   NEUTROS PCT % 89*   LYMPHS PCT % 3*   MONOS PCT % 4   EOS PCT % 2     Results from last 7 days   Lab Units 06/08/21  0552  06/04/21  0501   SODIUM mmol/L 132*   < > 135*   POTASSIUM mmol/L 3 0*   < > 3 8   CHLORIDE mmol/L 92*   < > 95*   CO2 mmol/L 35*   < > 30   BUN mg/dL 12   < > 14   CREATININE mg/dL 0 52*   < > 0 58*   ANION GAP mmol/L 5   < > 10   CALCIUM mg/dL 8 3   < > 8 7   ALBUMIN g/dL  --   --  1 4*   TOTAL BILIRUBIN mg/dL  --   --  0 61   ALK PHOS U/L  --   --  151*   ALT U/L  --   --  34   AST U/L  --   --  23   GLUCOSE RANDOM mg/dL 102   < > 91    < > = values in this interval not displayed  Results from last 7 days   Lab Units 06/04/21  1959   INR  1 79*             Results from last 7 days   Lab Units 06/05/21  0534 06/04/21  0501 06/03/21  0633 06/02/21  1724 06/02/21  1527   LACTIC ACID mmol/L  --   --   --  1 6 3 4*   PROCALCITONIN ng/ml 25 75* 29 79* 8 38*  --  0 63*           * I Have Reviewed All Lab Data Listed Above  * Additional Pertinent Lab Tests Reviewed: All Labs Within Last 24 Hours Reviewed    Imaging:  CXR    Recent Cultures (last 7 days):     Results from last 7 days   Lab Units 06/02/21  1527   BLOOD CULTURE  No Growth After 5 Days  No Growth After 5 Days  Last 24 Hours Medication List:   Current Facility-Administered Medications   Medication Dose Route Frequency Provider Last Rate    acetaminophen  650 mg Oral Q6H PRN Vandana Carrillo MD      apixaban  10 mg Oral BID Mack Obrien MD      Followed by   Nicole Fernandez ON 6/14/2021] apixaban  5 mg Oral BID Mack Obrien MD      ipratropium  0 5 mg Nebulization TID TRISTAN Singleton      levalbuterol  1 25 mg Nebulization TID TRISTAN Singleton      midodrine  15 mg Oral TID VALENTINA Kaye PA-C          Today, Patient Was Seen By: Mack Obrien MD    ** Please Note: Dictation voice to text software may have been used in the creation of this document   **

## 2021-06-08 NOTE — OCCUPATIONAL THERAPY NOTE
Occupational Therapy Evaluation     Patient Name: Deangelo ENGLISH'S Date: 6/8/2021  Problem List  Principal Problem:    Systemic inflammatory response syndrome (SIRS) due to noninfectious process [facility])  Active Problems:    Hodgkin lymphoma of intra-abdominal lymph nodes (HCC)    Anemia    Hyponatremia    Severe protein-calorie malnutrition (HCC)    Hypotension    Single subsegmental pulmonary embolism without acute cor pulmonale (HCC)    Hypokalemia    Past Medical History  Past Medical History:   Diagnosis Date    Lymphoma [facility])      Past Surgical History  History reviewed  No pertinent surgical history            06/08/21 0912   OT Last Visit   OT Visit Date 06/08/21   Note Type   Note type Evaluation   Restrictions/Precautions   Weight Bearing Precautions Per Order No   Other Precautions Multiple lines;Telemetry;O2;Fall Risk   Pain Assessment   Pain Assessment Tool Pain Assessment not indicated - pt denies pain   Pain Score No Pain   Home Living   Type of [address] [place] [address] One level   Bathroom Shower/Tub Tub/shower unit   Bathroom Toilet Standard   Bathroom Equipment   (pt/family reports no AD/DME)   [facility]   (pt/family denies any DME/AD within home)   Additional Comments pt's wife reports renting a single story home with 0STE    Prior Function   Level of [place] Needs assistance with ADLs and functional mobility   Lives With Spouse  (wife provides all support )   Receives Help From Family   ADL Assistance Needs assistance  (self feeding w/ setup,)   IADLs Needs assistance   Falls in the last 6 months 1 to 4  (pt wife reports pt tripped on edge of bed )   Vocational On disability   Comments pt lives with support of wife for all ADLs, IADLs, & functional mobility    Lifestyle   Autonomy pt values continued support of wife within his home for all ADLs, IADLs, and functional mobility    Reciprocal Relationships wife    Service to Others [employer] home with his wife's cooking    Psychosocial   Psychosocial (WDL) WDL   Subjective   Subjective pt's wife "I do everything for him"    ADL   Where Assessed Chair   Eating Assistance 4  Minimal Assistance   Grooming Assistance 4  Minimal Assistance   UB Bathing Assistance 3  Moderate [address] 2  Maximal [address] 3  Moderate Assistance   LB Dressing Assistance 2  Maximal [address]  2  Maximal Assistance   Functional Assistance 2  Maximal Assistance   Functional Deficit Setup;Steadying;Verbal cueing;Supervision/safety; Increased time to complete   Bed Mobility   Additional Comments pt recieved in chair and post session    Transfers   Sit to Stand 4  Minimal assistance   Additional items Assist x 1; Armrests; Increased time required;Verbal cues  (w/ RW )   Stand to Sit 4  Minimal assistance   Additional items Assist x 1; Armrests; Increased time required  (w/ RW )   Additional Comments pt initally sit<>stand with MOD Ax1    Functional Mobility   Functional Mobility 4  Minimal assistance  (w/RW & hand held assist)   Additional Comments Ax2    Additional items Rolling walker   Balance   Static Sitting Fair   Dynamic Sitting Fair -   Static Standing Poor +   Dynamic Standing Poor   Ambulatory Poor   Activity Tolerance   Activity Tolerance Patient tolerated treatment well;Treatment limited secondary to medical complications (Comment)  (limited by 02 line and decreased endurance 2* cancer dx )   Nurse Made Aware appropriate to see per Danie SHIELDS    RUE Assessment   RUE Assessment WFL  (grossly 3+/5)   LUE Assessment   LUE Assessment WFL  (grossly 3+/5 )   Hand Function   Gross Motor Coordination Functional   Sensation   Light Touch No apparent deficits   Proprioception   Proprioception No apparent deficits   Vision - Complex Assessment   Ocular Range of Motion [facility]   Head Position WDL   Perception   Inattention/Neglect Appears intact   Cognition   Overall Cognitive Status WFL   Arousal/Participation Responsive; Cooperative   Attention Within functional limits   Orientation Level Oriented to person;Oriented to place  (w/ multiple choice pt able to state month )   Memory Unable to assess   Following Commands Follows one step commands with increased time or repetition  (responsive to wife, language barrier may have been a factor )   Comments pt with wife present, wife often answered for pt  pt pleasant and followed directions w/ visual cues  difficult to assess 2* Samoan speaking    Assessment   Limitation Decreased ADL status; Decreased UE ROM; Decreased UE strength;Decreased Safe judgement during ADL;Decreased endurance;Decreased fine motor control;Decreased self-care trans;Decreased high-level ADLs; Decreased cognition   Prognosis Fair   Assessment Pt is a 77 y o  male  seen for OT evaluation after admission to [place] [address] 6/2/2021 for fever, chills and cough  Comorbidities impacting pt's occupational outcomes include hodgkin lymphoma of intra-abdominal lymph nodes (tx at [facility] since 2018), systemic inflammatory response syndrome due to non infectious process, anemia, hypotension, single subsegmental PE of R lower lobe without acute carpulmonale, severe protein-calorie malnutrition, and hypokalemia  Pt has active OT orders and activity as tolerated orders  OT was consulted to assess pt's functional status and occupational performance in order to determine discharge needs  Contexts influencing pts occupational performance include living in a single story home with the continuous support of his wife and limited financial resources (per wife)  Pt and wife Samoan speaking only  PTA, pt was MIN A  assist in ADLs, IADLs, & functional mobility, and without equipment  At time of evaluation pt's wife was present and [employer] interpretation services were used    Pt required the following level of assistpt SBA eating, MIN A  grooming, MOD A UB ADLs, MAX A LB ADLs, MAX A toileting, MIN A x2  functional mobility, MIN A  functional transfers  Pt required VCs for safety and techniques w/ RW  Deficits impacting pts occupational performance includeweakness, impaired balance, decreased endurance, increased fall risk, decreased ADLS, decreased IADLS, decreased activity tolerance, decreased safety awareness decreased strength, and increased processing time  Pt would benefit from continued OT tx 3-5x/wk to promote safety and address immediate occupational deficits in order to return to prior level of functioning  When medically stable, OT recommended discharge to home w/ home OT & 24/7 family supervision/support vs post acute rehabilitation if family unable to provide increased support/assistance  The patient's raw score on the AM-PAC Daily Activity inpatient short form is 13, standardized score is 32 03, less than 39 4  Patients at this level are likely to benefit from discharge to post-acute rehabilitation services  Please refer to the recommendation of the Occupational Therapist for safe discharge planning  Goals   Patient Goals none expressed at this time    LTG Time Frame 10-14   Long Term Goal Please see below    Plan   Treatment Interventions ADL retraining;Functional transfer training;UE strengthening/ROM; Endurance training;Patient/family training;Equipment evaluation/education; Compensatory technique education; Energy conservation; Activityengagement;Cognitive reorientation   Goal Expiration Date 06/22/21   OT Frequency 3-5x/wk   Recommendation   Equipment Recommended Bedside commode  (RW )   Commode Type Standard   OT - OK to Discharge Yes  (when medically stable )   Additional Comments  home w/ home OT & 24/7 family supervision/support vs post acute rehabilitation if family unable to provide increased support/assistance   AM-PAC Daily Activity Inpatient   Lower Body Dressing 1   Bathing 2   Toileting 2   Upper Body Dressing 2   Grooming 3   Eating 3 Daily Activity Raw Score 13   Daily Activity Standardized Score (Calc for Raw Score >=11) 32 03   AM-PAC Applied Cognition Inpatient   Following a Speech/Presentation 3   Understanding Ordinary Conversation 4   Taking Medications 2   Remembering Where Things Are Placed or Put Away 3   Remembering List of 4-5 Errands 3   Taking Care of Complicated Tasks 2   Applied Cognition Raw Score 17   Applied Cognition Standardized Score 36 52     The below listed goals are to be met within 10-14 days  1  Pt will increase independence in UB ADLs to MIN A  with good balance  2  Pt will improve independence in LB ADLs to  MIN A with good balance and use of AE PRN  3  Pt will improve functional transfers on/off all surfaces to SBA using DME PRN with G balance and safety  4  Pt will improve activity tolerance to G for 30 min txment sessions for enhanced functional transfers and ADLs performance  5  Pt will improve dynamic standing balance during functional tasks to Fair- using DME PRN  6  Pt will engage in ongoing cognitive assessment w/ good participation to assist with safe d/c planning/recommendations  7  Pt will complete toileting with MIN A  with good hygiene and AD as needed  8  Pt will improve functional mobility to SBA during ADL/IADL/leisure tasks using DME PRN w/ good balance/safety  9  Caregiver will independently demonstrate good carryover of caregiver education across all tx sessions to enhance safety upon return home       MANJINDER Valenzuela

## 2021-06-08 NOTE — ASSESSMENT & PLAN NOTE
· Likely SIRS from B symptoms due to lymphoma  · However, noted to have elevated lactic acid together with fever and tachycardia on admission  · Infectious disease suspects this is likely secondary to stage IV Hodgkin lymphoma with metastasis-discontinued antibiotics   · Patient admitted to [facility] approximately a week and half ago for similar symptoms-plan for outpatient follow-up on Tuesday if discharged prior to then  Unfortunately, patient with respiratory failure was not able to be discharged to make the appointment  · CT Chest, abdomen , pelvis with contrast--> evidence multifocal malignancy in the chest, abdomen, and pelvis involving lung parenchyma, lymph nodes, liver, and spleen  Subsegmental right lower lobe PE    Small pleural effusions  · Seen in consult by Hematology-Oncology

## 2021-06-08 NOTE — PROGRESS NOTES
Progress Note - Infectious Disease   Argentina Zuniga 77 y o  male MRN: [id number]  Unit/Bed#: E5 -01 Encounter: [id number]      Impression/Plan:    1- SIRS, POA:  Fever, tachycardia noted on admission  No infectious focus identified based on review of system, physical exam, UA, blood culture, CT chest abdomen and pelvis  His COVID-19 PCR was negative  Workup done at [facility] reviewed, including negative blood culture from 05/24 and 05/25/2021, and a negative respiratory viral panel  Fever/chills/night sweats most likely secondary to lymphoma/B symptoms   All blood cultures have no growth  Patient had been on antibiotics initially, but now off them  Remains intermittently febrile    remains clinically and systemically well off antibiotics  -  continue to observe off antibiotics  - Heme-Onc follow-up     2- stage IVB Hodgkin lymphoma:  Last treated 08/2020 with nivolumab, then, from currently available records in Care everywhere, it appears that patient moved to [facility] and pursued homeopathic treatment, but unfortunately now his last CT scan abdomen pelvis is suggestive of progressive disease, new sites of lymphomatous process, including liver, spleen, soft tissue in the paraspinal area  - as mentioned above, SIRS is most likely related to his widespread lymphoma and B symptoms   - patient needs close oncology follow-up     3-  small PE   Patient minimally symptomatic   -  anticoagulation per primary service     4  Hypoxic respiratory failure:  Likely related to problem 3  Also possible component of multiple metastasis  - patient remains hospitalized while slim provider trying to arrange with  outpatient oxygen treatment  - no evidence of bacterial pneumonia at this time  Continue to monitor off antibiotics     5- protein calorie malnutrition:  Likely related to problem 2    - oncology evaluation to determine to reinitiate immunotherapy    Evansgilma Medina follow-up  - palliative care follow-up     Discussed with patient  Okay for discharge from ID viewpoint  Patient needs close Heme-Onc follow-up after discharge in order to start treatment directed to the lymphoma  Will sign off, call back ID service for questions or if change in clinical status     Antibiotics:  None    Subjective:  Patient denies fever and chills  Denies pain  Denies shortness of breath despite remaining on nasal cannula 4 liters/minute  Denies nausea or vomiting      Objective:  Vitals:  Temp:  [100 1 °F (37 8 °C)-100 2 °F (37 9 °C)] 100 2 °F (37 9 °C)  HR:  [] 94  Resp:  [18] 18  BP: ()/(62-72) 94/62  SpO2:  [95 %-98 %] 98 %  Temp (24hrs), Av 2 °F (37 9 °C), Min:100 1 °F (37 8 °C), Max:100 2 °F (37 9 °C)  Current: Temperature: 100 2 °F (37 9 °C)    Physical Exam:   General Appearance:  AAO x3, interactive, no signs of acute distress, but appears chronically ill, currently on nasal cannula 4 liters/minute   Throat: Oropharynx moist without lesions  Lungs:    no wheezes, rhonchi or rales; respirations unlabored   Heart:  RRR; no murmur, rub or gallop   Abdomen:   Soft, non-tender, non-distended, positive bowel sounds  Extremities: No clubbing, cyanosis or edema   Skin: No new rashes or lesions  No draining wounds noted         Labs, Imaging, & Other studies:   All pertinent labs and imaging studies were personally reviewed  Results from last 7 days   Lab Units 21  0353 21  1844 21  0536 21  0534   WBC Thousand/uL 5 55  --  6 14 5 22   HEMOGLOBIN g/dL 8 1* 8 3* 6 7* 7 8*   PLATELETS Thousands/uL 277  --  312 372     Results from last 7 days   Lab Units 21  0552  21  0353 21  0537  21  0501 21  0633 21  1527   SODIUM mmol/L 132*  --  134* 131*   < > 135* 136 135*   POTASSIUM mmol/L 3 0*   < > 2 9* 3 5   < > 3 8 4 1 4 3   CHLORIDE mmol/L 92*  --  93* 94*   < > 95* 97* 95*   CO2 mmol/L 35*  --  34* 30   < > 30 30 35* BUN mg/dL 12  --  15 14   < > 14 10 12   CREATININE mg/dL 0 52*  --  0 50* 0 47*   < > 0 58* 0 54* 0 57*   EGFR ml/min/1 73sq m 111  --  113 116   < > 106 109 107   CALCIUM mg/dL 8 3  --  8 1* 8 1*   < > 8 7 8 5 8 5   AST U/L  --   --   --   --   --  23 32 40   ALT U/L  --   --   --   --   --  34 41 49   ALK PHOS U/L  --   --   --   --   --  151* 186* 206*    < > = values in this interval not displayed  Results from last 7 days   Lab Units 06/02/21  1527   BLOOD CULTURE  No Growth After 5 Days  No Growth After 5 Days       Results from last 7 days   Lab Units 06/05/21  0534 06/04/21  0501 06/03/21  0633 06/02/21  1527   PROCALCITONIN ng/ml 25 75* 29 79* 8 38* 0 63*

## 2021-06-08 NOTE — PLAN OF CARE
Problem: Potential for Falls  Goal: Patient will remain free of falls  Description: INTERVENTIONS:  - Assess patient frequently for physical needs  -  Identify cognitive and physical deficits and behaviors that affect risk of falls    -  [place] fall precautions as indicated by assessment   - Educate patient/family on patient safety including physical limitations  - Instruct patient to call for assistance with activity based on assessment  - Modify environment to reduce risk of injury  - Consider OT/PT consult to assist with strengthening/mobility  Outcome: Progressing     Problem: PAIN - ADULT  Goal: Verbalizes/displays adequate comfort level or baseline comfort level  Description: Interventions:  - Encourage patient to monitor pain and request assistance  - Assess pain using appropriate pain scale  - Administer analgesics based on type and severity of pain and evaluate response  - Implement non-pharmacological measures as appropriate and evaluate response  - Consider cultural and social influences on pain and pain management  - Notify physician/advanced practitioner if interventions unsuccessful or patient reports new pain  Outcome: Progressing     Problem: INFECTION - ADULT  Goal: Absence or prevention of progression during hospitalization  Description: INTERVENTIONS:  - Assess and monitor for signs and symptoms of infection  - Monitor lab/diagnostic results  - Monitor all insertion sites, i e  indwelling lines, tubes, and drains  - Monitor endotracheal if appropriate and nasal secretions for changes in amount and color  - [place] appropriate cooling/warming therapies per order  - Administer medications as ordered  - Instruct and encourage patient and family to use good hand hygiene technique  - Identify and instruct in appropriate isolation precautions for identified infection/condition  Outcome: Progressing  Goal: Absence of fever/infection during neutropenic period  Description: INTERVENTIONS:  - Monitor WBC    Outcome: Progressing     Problem: SAFETY ADULT  Goal: Patient will remain free of falls  Description: INTERVENTIONS:  - Assess patient frequently for physical needs  -  Identify cognitive and physical deficits and behaviors that affect risk of falls    -  [place] fall precautions as indicated by assessment   - Educate patient/family on patient safety including physical limitations  - Instruct patient to call for assistance with activity based on assessment  - Modify environment to reduce risk of injury  - Consider OT/PT consult to assist with strengthening/mobility  Outcome: Progressing  Goal: Maintain or return to baseline ADL function  Description: INTERVENTIONS:  -  Assess patient's ability to carry out ADLs; assess patient's baseline for ADL function and identify physical deficits which impact ability to perform ADLs (bathing, care of mouth/teeth, toileting, grooming, dressing, etc )  - Assess/evaluate cause of self-care deficits   - Assess range of motion  - Assess patient's mobility; develop plan if impaired  - Assess patient's need for assistive devices and provide as appropriate  - Encourage maximum independence but intervene and supervise when necessary  - Involve family in performance of ADLs  - Assess for home care needs following discharge   - Consider OT consult to assist with ADL evaluation and planning for discharge  - Provide patient education as appropriate  Outcome: Progressing  Goal: Maintain or return mobility status to optimal level  Description: INTERVENTIONS:  - Assess patient's baseline mobility status (ambulation, transfers, stairs, etc )    - Identify cognitive and physical deficits and behaviors that affect mobility  - Identify mobility aids required to assist with transfers and/or ambulation (gait belt, sit-to-stand, lift, walker, cane, etc )  - [place] fall precautions as indicated by assessment  - Record patient progress and toleration of activity level on Mobility SBAR; progress patient to next Phase/Stage  - Instruct patient to call for assistance with activity based on assessment  - Consider rehabilitation consult to assist with strengthening/weightbearing, etc   Outcome: Progressing     Problem: DISCHARGE PLANNING  Goal: Discharge to home or other facility with appropriate resources  Description: INTERVENTIONS:  - Identify barriers to discharge w/patient and caregiver  - Arrange for needed discharge resources and transportation as appropriate  - Identify discharge learning needs (meds, wound care, etc )  - Arrange for interpretive services to assist at discharge as needed  - Refer to Case Management Department for coordinating discharge planning if the patient needs post-hospital services based on physician/advanced practitioner order or complex needs related to functional status, cognitive ability, or social support system  Outcome: Progressing     Problem: Knowledge Deficit  Goal: Patient/family/caregiver demonstrates understanding of disease process, treatment plan, medications, and discharge instructions  Description: Complete learning assessment and assess knowledge base    Interventions:  - Provide teaching at level of understanding  - Provide teaching via preferred learning methods  Outcome: Progressing     Problem: Prexisting or High Potential for Compromised Skin Integrity  Goal: Skin integrity is maintained or improved  Description: INTERVENTIONS:  - Identify patients at risk for skin breakdown  - Assess and monitor skin integrity  - Assess and monitor nutrition and hydration status  - Monitor labs   - Assess for incontinence   - Turn and reposition patient  - Assist with mobility/ambulation  - Relieve pressure over bony prominences  - Avoid friction and shearing  - Provide appropriate hygiene as needed including keeping skin clean and dry  - Evaluate need for skin moisturizer/barrier cream  - Collaborate with interdisciplinary team   - Patient/family teaching  - Consider wound care consult   Outcome: Progressing     Problem: Nutrition/Hydration-ADULT  Goal: Nutrient/Hydration intake appropriate for improving, restoring or maintaining nutritional needs  Description: Monitor and assess patient's nutrition/hydration status for malnutrition  Collaborate with interdisciplinary team and initiate plan and interventions as ordered  Monitor patient's weight and dietary intake as ordered or per policy  Utilize nutrition screening tool and intervene as necessary  Determine patient's food preferences and provide high-protein, high-caloric foods as appropriate       INTERVENTIONS:  - Monitor oral intake, urinary output, labs, and treatment plans  - Assess nutrition and hydration status and recommend course of action  - Evaluate amount of meals eaten  - Assist patient with eating if necessary   - Allow adequate time for meals  - Recommend/ encourage appropriate diets, oral nutritional supplements, and vitamin/mineral supplements  - Order, calculate, and assess calorie counts as needed  - Recommend, monitor, and adjust tube feedings and TPN/PPN based on assessed needs  - Assess need for intravenous fluids  - Provide specific nutrition/hydration education as appropriate  - Include patient/family/caregiver in decisions related to nutrition  Outcome: Progressing

## 2021-06-08 NOTE — PHYSICAL THERAPY NOTE
Physical Therapy Evaluation      Patient Active Problem List   Diagnosis    Systemic inflammatory response syndrome (SIRS) due to noninfectious process ([facility] )    Hodgkin lymphoma of intra-abdominal lymph nodes (HCC)    Anemia    Hyponatremia    Severe protein-calorie malnutrition (HCC)    Hypotension    Single subsegmental pulmonary embolism without acute cor pulmonale (HCC)    Hypokalemia       Past Medical History:   Diagnosis Date    Lymphoma ([facility] )        History reviewed  No pertinent surgical history  06/08/21 0835   PT Last Visit   PT Visit Date 06/08/21   Note Type   Note type Evaluation   Pain Assessment   Pain Assessment Tool Pain Assessment not indicated - pt denies pain   Home Living   Type of [address] One level  (1 stair to enter)   Additional Comments no dme   Prior Function   Level of [place] Needs assistance with IADLs; Needs assistance with ADLs and functional mobility   Lives With Spouse   Receives Help From Family   ADL Assistance Needs assistance   IADLs Needs assistance   Falls in the last 6 months 1 to 4  (1)   Comments pt lives with wife, she cares and assists all mobility and adl's  pt has 24/7 assist  no home O2  Restrictions/Precautions   Weight Bearing Precautions Per Order No   Other Precautions Fall Risk;O2;Cognitive   General   Family/Caregiver Present Yes   Cognition   Overall Cognitive Status Impaired   Orientation Level Oriented to person;Oriented to place   RUE Assessment   RUE Assessment WFL   LUE Assessment   LUE Assessment WFL   RLE Assessment   RLE Assessment X  (str 4/5)   LLE Assessment   LLE Assessment X  (str 4/5)   Coordination   Movements are Fluid and Coordinated 1   Sensation WFL   Light Touch   RLE Light Touch Grossly intact   LLE Light Touch Grossly intact   Transfers   Sit to Stand 4  Minimal assistance   Additional items Assist x 1; Armrests; Increased time required;Verbal cues   Stand to Sit 4  Minimal assistance   Additional items Assist x 1; Armrests; Increased time required;Verbal cues   Ambulation/Elevation   Gait pattern Decreased foot clearance;Shuffling; Short stride; Excessively slow   Gait Assistance 4  Minimal assist   Additional items Assist x 2;Verbal cues; Tactile cues   Assistive Device None  (arm in arm)   Distance 10'   Balance   Static Sitting Fair   Dynamic Sitting Fair -   Static Standing Poor +   Dynamic Standing Poor   Ambulatory Poor   Endurance Deficit   Endurance Deficit Yes   Endurance Deficit Description mild [place], O2 at 2L   Activity Tolerance   Activity Tolerance Patient limited by fatigue;Treatment limited secondary to medical complications (Comment)   Nurse Made Aware yes   Assessment   Prognosis Good   Problem List Decreased strength;Decreased endurance; Impaired balance;Decreased mobility; Impaired judgement;Decreased safety awareness   Assessment pt admitted wtih cough, fever and chills  dx wtih uti , sirs, fever, anemia, spesis and hyponatremia  pt referred to PT  pt was needing assist for set up and some mobility PTA, wife has been caring for him    pt has had 1 fall  pt lives in single floor home, 1 stair to enter  pt demonstrated moderate functional limitations due to chronic illness (lymphoma) , current illness and chronic debility  pt was able to amb 10' using min assist arm in arm  noted to have [place]go on short distance  pt has current deficits in strength, balance, gait stability and sequencing, pulmonary function and activity tolearnce  pt desires to  return home and wife agrees  pt can return home, recommend rw, bsc, home PT  Barriers to Discharge None   Goals   Patient Goals be able to go home, walk better   STG Expiration Date 06/22/21   Short Term Goal #1 supervision for bed mobility and transfers, amb with least restrictive device for > 100'  imporove strength and balance by 1/2 grade  demonstrate good safety practices      Plan   Treatment/Interventions Functional transfer training;LE strengthening/ROM; Therapeutic exercise; Endurance training;Cognitive reorientation;Patient/family training;Equipment eval/education; Bed mobility;Gait training;Spoke to nursing;OT   PT Frequency   (4-5x/week)   Recommendation   PT Discharge Recommendation Home with home health rehabilitation   Equipment Recommended Walker  (bsc)   Walker Package Recommended Wheeled walker   PT - OK to Discharge Yes   AM-PAC Basic Mobility Inpatient   Turning in Bed Without Bedrails 3   Lying on Back to Sitting on Edge of Flat Bed 3   Moving Bed to Chair 3   Standing Up From Chair 3   Walk in Room 3   Climb 3-5 Stairs 2   Basic Mobility Inpatient Raw Score 17   Basic Mobility Standardized Score 39 67   History: co - morbidities, fall risk, use of assistive device, assist for adl's, cognition, multiple lines  Exam: impairments in locomotion, musculoskeletal, balance, pulmonary, cognition   Clinical: unstable/unpredictable  Complexity:high      Tressa Bones, PT

## 2021-06-08 NOTE — ASSESSMENT & PLAN NOTE
· Stage IV B  · Initial diagnosis in 2018, initially denied treatment secondary to Bahai beliefs  · Known to hematologist/oncologist Dr Fam Givens, at [facility]  · According to his wife and [facility] records he will need immunotherapy , however this has not been started due to insurance issues  · Patient refused chemotherapy in the past, was previously treated with Nivolumab  · Patient was last evaluated by Dr Fam Givens on 5/4 and agreed to restarting Nivolumab every 2 weeks, is scheduled to restart on 06/08  · CT Chest, abdomen , pelvis with contrast--> evidence multifocal malignancy in the chest, abdomen, and pelvis involving lung parenchyma, lymph nodes, liver, and spleen  Subsegmental right lower lobe PE    Small pleural effusions  · Heme onc follows

## 2021-06-08 NOTE — ASSESSMENT & PLAN NOTE
Up to 130s today  Likely reactive and related to overall malignancy processes, fever, difficulty breathing  Will continue to monitor  2D echo ordered

## 2021-06-08 NOTE — ASSESSMENT & PLAN NOTE
· Patient has remained hypotensive throughout admission  · Improved today  · Midodrine 15mg TID  · Blood pressures are currently improved

## 2021-06-09 ENCOUNTER — APPOINTMENT (INPATIENT)
Dept: NON INVASIVE DIAGNOSTICS | Facility: HOSPITAL | Age: 66
DRG: 840 | End: 2021-06-09
Payer: MEDICARE

## 2021-06-09 LAB
ANION GAP SERPL CALCULATED.3IONS-SCNC: 2 MMOL/L (ref 4–13)
BUN SERPL-MCNC: 12 MG/DL (ref 5–25)
CALCIUM SERPL-MCNC: 8.2 MG/DL (ref 8.3–10.1)
CHLORIDE SERPL-SCNC: 92 MMOL/L (ref 100–108)
CO2 SERPL-SCNC: 36 MMOL/L (ref 21–32)
CREAT SERPL-MCNC: 0.57 MG/DL (ref 0.6–1.3)
GFR SERPL CREATININE-BSD FRML MDRD: 107 ML/MIN/1.73SQ M
GLUCOSE SERPL-MCNC: 96 MG/DL (ref 65–140)
MAGNESIUM SERPL-MCNC: 1.7 MG/DL (ref 1.6–2.6)
POTASSIUM SERPL-SCNC: 3.7 MMOL/L (ref 3.5–5.3)
SODIUM SERPL-SCNC: 130 MMOL/L (ref 136–145)

## 2021-06-09 PROCEDURE — 94761 N-INVAS EAR/PLS OXIMETRY MLT: CPT

## 2021-06-09 PROCEDURE — 83735 ASSAY OF MAGNESIUM: CPT | Performed by: FAMILY MEDICINE

## 2021-06-09 PROCEDURE — 93306 TTE W/DOPPLER COMPLETE: CPT | Performed by: INTERNAL MEDICINE

## 2021-06-09 PROCEDURE — 94640 AIRWAY INHALATION TREATMENT: CPT

## 2021-06-09 PROCEDURE — 93306 TTE W/DOPPLER COMPLETE: CPT

## 2021-06-09 PROCEDURE — 99232 SBSQ HOSP IP/OBS MODERATE 35: CPT | Performed by: FAMILY MEDICINE

## 2021-06-09 PROCEDURE — 80048 BASIC METABOLIC PNL TOTAL CA: CPT | Performed by: FAMILY MEDICINE

## 2021-06-09 PROCEDURE — 99222 1ST HOSP IP/OBS MODERATE 55: CPT | Performed by: INTERNAL MEDICINE

## 2021-06-09 RX ORDER — FUROSEMIDE 10 MG/ML
20 INJECTION INTRAMUSCULAR; INTRAVENOUS ONCE
Status: DISCONTINUED | OUTPATIENT
Start: 2021-06-09 | End: 2021-06-10 | Stop reason: HOSPADM

## 2021-06-09 RX ADMIN — APIXABAN 10 MG: 5 TABLET, FILM COATED ORAL at 08:07

## 2021-06-09 RX ADMIN — ACETAMINOPHEN 650 MG: 325 TABLET, FILM COATED ORAL at 20:15

## 2021-06-09 RX ADMIN — MIDODRINE HYDROCHLORIDE 15 MG: 5 TABLET ORAL at 16:51

## 2021-06-09 RX ADMIN — LEVALBUTEROL HYDROCHLORIDE 1.25 MG: 1.25 SOLUTION, CONCENTRATE RESPIRATORY (INHALATION) at 07:21

## 2021-06-09 RX ADMIN — LEVALBUTEROL HYDROCHLORIDE 1.25 MG: 1.25 SOLUTION, CONCENTRATE RESPIRATORY (INHALATION) at 19:18

## 2021-06-09 RX ADMIN — ACETAMINOPHEN 650 MG: 325 TABLET, FILM COATED ORAL at 08:07

## 2021-06-09 RX ADMIN — MIDODRINE HYDROCHLORIDE 15 MG: 5 TABLET ORAL at 06:25

## 2021-06-09 RX ADMIN — LEVALBUTEROL HYDROCHLORIDE 1.25 MG: 1.25 SOLUTION, CONCENTRATE RESPIRATORY (INHALATION) at 13:22

## 2021-06-09 RX ADMIN — APIXABAN 10 MG: 5 TABLET, FILM COATED ORAL at 20:20

## 2021-06-09 RX ADMIN — IPRATROPIUM BROMIDE 0.5 MG: 0.5 SOLUTION RESPIRATORY (INHALATION) at 07:21

## 2021-06-09 RX ADMIN — MIDODRINE HYDROCHLORIDE 15 MG: 5 TABLET ORAL at 12:15

## 2021-06-09 RX ADMIN — IPRATROPIUM BROMIDE 0.5 MG: 0.5 SOLUTION RESPIRATORY (INHALATION) at 13:22

## 2021-06-09 RX ADMIN — IPRATROPIUM BROMIDE 0.5 MG: 0.5 SOLUTION RESPIRATORY (INHALATION) at 19:18

## 2021-06-09 NOTE — PHYSICAL THERAPY NOTE
Physical Therapy Cancellation Note  Attempted to see pt for PT treatment at 12:55  Cx due to medical status  -126 at rest & pt's wife requesting increased O2 due to pt being SOB  SPO2 94%  RN Janell notified   Attempt again as tolerated  Maco An, PTA

## 2021-06-09 NOTE — CONSULTS
Consult - Cardiology   Kaushik Rhodes 77 y o  male MRN: [id number]  Unit/Bed#: E5 -01 Encounter: [id number]        Reason For Consult:  Sinus tachycardia                 Assessment:  SIRS (POA):   Evaluated by primary service and Infectious Disease with feeling this is due to patient has metastatic Hodgkin's lymphoma  Stage IV Hodgkin's lymphoma with metastatic disease   DX 2018, previously treated with Nivolumab then abandoned in favor homeopathic therapy   Currently followed by [facility] oncologist Dr Naomie Barrera with previous plan to resume Nivolumab this week   CT scan of this hospitalization with evidence of multifocal metastases  Subsegmental PE:   DX per CT of this admission   Eliquis 10 mg b i d  Initiated 6/7/2021  Anemia (POA):   Presenting hemoglobin 6 7   Status post transfusion 6/6/2020  Persistent sinus tachycardia:  Likely poly factorial related to anemia, fever, metastatic cancer, with possibility of cardiomyopathy not yet excluded         Discussion / Plan:  #  patient with persistent sinus tachycardia since admission noting anemia (hemoglobin 6 7-8 7), pulmonary embolism, and SIRS with persistent fever in the context of metastatic lymphoma   # patient transient ischemic ECG changes during this hospitalization (see below) with unclear history of angina (poor historian)    · With low normal BP on Midodrine and pending echocardiogram would at this point advise not treat with negative chronotropic agent, instead treating intercurrent issues which are promoting sinus tachycardia noting that some component of tachycardia is to be expected  · With probable subclinical CAD 1 could give consideration to occlusion of aspirin though with patient recently initiated on anticoagulant with signs of anemia present on arrival would suggest withholding this with reassessment in the outpatient setting    · Check echocardiogram for assessment of structural heart disease/signs of ischemic heart disease/biventricular dysfunction ~~> if no significant abnormality found, no additional DX/Tx would be advised from cardiology perspective  · Try to wean patient from oxygen and if unable it may be reasonable to try low-dose oral Lasix as he did have some signs of pulmonary edema on recent radiograph (also sign of small effusion which does not likely merit thoracentesis)      History Of Present Illness:  Gris Rowe is a Ecuadorean-speaking 51-year-old who historically gets his care in the [facility] system, without prior care by cardiologist, and who is currently hospitalized having presented to the emergency on 6/2/2021 because of recurrent fever  This gentleman has a history of stage IV Hodgkin's lymphoma with metastases affecting the spleen, liver, paraspinal soft tissues, and lymph system for which he follows with an oncologist at [facility]   He apparently was initiated on nivolumab in August 2020 with then moved to the [facility] where her pursued homeopathic therapy  Since returning he has reestablished care with oncologist but has not yet resumed chemotherapy  With this he has had intermittent recurrent fevers present for several months and evaluate the [facility] last month       Since admission he was seen by infectious disease with negative cultures and no focal sign of infection and suspicion that again, his fevers are related to his cancer  On 6/4 the patient had a CT scan chest abdomen and pelvis describing multifocal malignancy as discussed above  There is additional notation of sub segmental right lower lobe pulmonary embolism for which she was initiated on Eliquis  This gentleman has had slightly hypotensive to low normal BPs and sinus tachycardia throughout much of this hospitalization    On 6/7 the patient had increased dyspnea requiring supplemental oxygen with chest x-ray showing signs of pulmonary vascular congestion and yesterday he was observed to have some increase in his sinus tachycardia ventricular rates reportedly as high as 130 and is for these reasons we are asked to evaluate the patient  The patient was interviewed utilizing [employer] service  Despite doing so the patient was only a fair historian noting overall fatigue, weakness, probably also with some confusion  Patient denies any history of chest pain  He seems overall fatigue but denies any specific dyspnea or increased work of breathing  He admits to some feelings of abdominal bloating though not better quantified  He denies lower extremity edema and is seemingly unaware of his heart rate and rhythm  Past Medical History:        Past Medical History:   Diagnosis Date    Lymphoma [facility])     History reviewed  No pertinent surgical history  Allergy:        No Known Allergies    Medications:       Prior to Admission medications    Medication Sig Start Date End Date Taking? Authorizing Provider   ferrous sulfate 325 (65 Fe) mg tablet Take 325 mg by mouth daily with breakfast   Yes Historical Provider, MD   ibuprofen (MOTRIN) 200 mg tablet Take 400 mg by mouth every 6 (six) hours as needed for mild pain   Yes Historical Provider, MD   loratadine (CLARITIN) 10 mg tablet Take 10 mg by mouth daily   Yes Historical Provider, MD   pantoprazole (PROTONIX) 40 mg tablet Take 40 mg by mouth daily   Yes Historical Provider, MD   apixaban (ELIQUIS) 5 mg Take 2 tablets (10 mg total) by mouth 2 (two) times a day for 7 days, THEN 1 tablet (5 mg total) 2 (two) times a day for 23 days  6/7/21 7/7/21  Renuka Méndez MD   midodrine (PROAMATINE) 5 mg tablet Take 3 tablets (15 mg total) by mouth 3 (three) times a day before meals 6/7/21   Renuka Méndez MD       Family History:     History reviewed  No pertinent family history       Social History:       Social History     Socioeconomic History    Marital status: /Civil Union     Spouse name: None    Number of children: None    Years of education: None    Highest education level: None   Occupational History    None   Social Needs    Financial resource strain: None    Food insecurity     Worry: None     Inability: None    Transportation needs     Medical: None     Non-medical: None   Tobacco Use    Smoking status: Never Smoker    Smokeless tobacco: Never Used   Substance and Sexual Activity    Alcohol use: No    Drug use: No    Sexual activity: None   Lifestyle    Physical activity     Days per week: None     Minutes per session: None    Stress: None   Relationships    Social connections     Talks on phone: None     Gets together: None     Attends Quaker service: None     Active member of club or organization: None     Attends meetings of clubs or organizations: None     Relationship status: None    Intimate partner violence     Fear of current or ex partner: None     Emotionally abused: None     Physically abused: None     Forced sexual activity: None   Other Topics Concern    None   Social History Narrative    None       ROS:  Poorly obtainable from the patient due to significant overall fatigue and lethargy  The remainder of the review of systems is negative    Exam:  General:  Patient looks to be overall weak and fatigued though in no acute distress  Head: Normocephalic, atraumatic  Eyes:  EOMI  Pupils - equal, round, reactive to accomodation  No icterus  Normal Conjunctiva  Oropharynx: Moist without lesion  Neck:  No gross bruit, JVD, thyromegaly, or lymphadenopathy  Heart:  Regular with increased rate  Distant heart tones without obvious pathologic murmur  Lungs:  Clear without rales/rhonchi/wheeze  Abdomen:  Soft and seemingly nontender with normal bowel sounds   No organomegaly or mass  Lower Limbs:  No edema  Pulses[de-identified]  RLE - DP:  1+                 LLE - DP:  1+  Musculoskeletal: Independent movement of limbs observed, Formal ROM and strength eval not performed  Neurologic:    Oriented to:  Person, knew this was a hospital but not which one, aware of situation-having cancer without greater nuance  Cranial Nerves: grossly intact - vision, smell, taste, and hearing were not tested  Motor function: grossly normal, symmetric   Sensation: Was not tested    Vitals:    06/08/21 1633 06/08/21 2001 06/08/21 2316 06/09/21 0755   BP: 117/70  115/72 101/71   BP Location:       Pulse: (!) 118  (!) 108 (!) 117   Resp:   20 18   Temp:   (!) 102 3 °F (39 1 °C) 99 9 °F (37 7 °C)   TempSrc:       SpO2: 100% 100% 99% 98%   Weight:       Height:               DATA:      ECG:                                    -----------------------------------------------------------------------------------------------------------------------------------------------  Telemetry:   Sinus tachycardia with ventricular rate trend overall 100-120 with some suggestion of multifocal atrial dysrhythmia       -----------------------------------------------------------------------------------------------------------------------------------------------  Weights: Wt Readings from Last 20 Encounters:   06/05/21 55 5 kg (122 lb 5 7 oz)   09/14/17 63 5 kg (140 lb)   , Body mass index is 16 59 kg/m²           Lab Studies:    Results from last 7 days   Lab Units 06/08/21  2105 06/08/21  1834   TROPONIN I ng/mL 0 09* 0 12*            Results from last 7 days   Lab Units 06/08/21  1834 06/07/21  0353 06/06/21  1844 06/06/21  0536   WBC Thousand/uL 6 53 5 55  --  6 14   HEMOGLOBIN g/dL 8 7* 8 1* 8 3* 6 7*   HEMATOCRIT % 27 9* 26 0*  --  22 5*   PLATELETS Thousands/uL 269 277  --  312   ,   Results from last 7 days   Lab Units 06/09/21  0853 06/08/21  1834 06/08/21  0552  06/04/21  0501 06/03/21  0633 06/02/21  1527   POTASSIUM mmol/L 3 7 4 3 3 0*   < > 3 8 4 1 4 3   CHLORIDE mmol/L 92* 93* 92*   < > 95* 97* 95*   CO2 mmol/L 36* 35* 35*   < > 30 30 35*   BUN mg/dL 12 13 12   < > 14 10 12   CREATININE mg/dL 0 57* 0 48* 0 52*   < > 0 58* 0 54* 0 57*   CALCIUM mg/dL 8 2* 8 5 8 3   < > 8 7 8 5 8 5   ALK PHOS U/L  --   --   --   --  151* 186* 206*   ALT U/L  --   --   --   --  34 41 49   AST U/L  --   --   --   --  23 32 40    < > = values in this interval not displayed

## 2021-06-09 NOTE — PLAN OF CARE
Problem: Potential for Falls  Goal: Patient will remain free of falls  Description: INTERVENTIONS:  - Assess patient frequently for physical needs  -  Identify cognitive and physical deficits and behaviors that affect risk of falls    -  [place] fall precautions as indicated by assessment   - Educate patient/family on patient safety including physical limitations  - Instruct patient to call for assistance with activity based on assessment  - Modify environment to reduce risk of injury  - Consider OT/PT consult to assist with strengthening/mobility  Outcome: Progressing     Problem: PAIN - ADULT  Goal: Verbalizes/displays adequate comfort level or baseline comfort level  Description: Interventions:  - Encourage patient to monitor pain and request assistance  - Assess pain using appropriate pain scale  - Administer analgesics based on type and severity of pain and evaluate response  - Implement non-pharmacological measures as appropriate and evaluate response  - Consider cultural and social influences on pain and pain management  - Notify physician/advanced practitioner if interventions unsuccessful or patient reports new pain  Outcome: Progressing     Problem: INFECTION - ADULT  Goal: Absence or prevention of progression during hospitalization  Description: INTERVENTIONS:  - Assess and monitor for signs and symptoms of infection  - Monitor lab/diagnostic results  - Monitor all insertion sites, i e  indwelling lines, tubes, and drains  - Monitor endotracheal if appropriate and nasal secretions for changes in amount and color  - [place] appropriate cooling/warming therapies per order  - Administer medications as ordered  - Instruct and encourage patient and family to use good hand hygiene technique  - Identify and instruct in appropriate isolation precautions for identified infection/condition  Outcome: Progressing  Goal: Absence of fever/infection during neutropenic period  Description: INTERVENTIONS:  - Monitor WBC    Outcome: Progressing     Problem: SAFETY ADULT  Goal: Patient will remain free of falls  Description: INTERVENTIONS:  - Assess patient frequently for physical needs  -  Identify cognitive and physical deficits and behaviors that affect risk of falls    -  [place] fall precautions as indicated by assessment   - Educate patient/family on patient safety including physical limitations  - Instruct patient to call for assistance with activity based on assessment  - Modify environment to reduce risk of injury  - Consider OT/PT consult to assist with strengthening/mobility  Outcome: Progressing  Goal: Maintain or return to baseline ADL function  Description: INTERVENTIONS:  -  Assess patient's ability to carry out ADLs; assess patient's baseline for ADL function and identify physical deficits which impact ability to perform ADLs (bathing, care of mouth/teeth, toileting, grooming, dressing, etc )  - Assess/evaluate cause of self-care deficits   - Assess range of motion  - Assess patient's mobility; develop plan if impaired  - Assess patient's need for assistive devices and provide as appropriate  - Encourage maximum independence but intervene and supervise when necessary  - Involve family in performance of ADLs  - Assess for home care needs following discharge   - Consider OT consult to assist with ADL evaluation and planning for discharge  - Provide patient education as appropriate  Outcome: Progressing  Goal: Maintain or return mobility status to optimal level  Description: INTERVENTIONS:  - Assess patient's baseline mobility status (ambulation, transfers, stairs, etc )    - Identify cognitive and physical deficits and behaviors that affect mobility  - Identify mobility aids required to assist with transfers and/or ambulation (gait belt, sit-to-stand, lift, walker, cane, etc )  - [place] fall precautions as indicated by assessment  - Record patient progress and toleration of activity level on Mobility SBAR; progress patient to next Phase/Stage  - Instruct patient to call for assistance with activity based on assessment  - Consider rehabilitation consult to assist with strengthening/weightbearing, etc   Outcome: Progressing     Problem: DISCHARGE PLANNING  Goal: Discharge to home or other facility with appropriate resources  Description: INTERVENTIONS:  - Identify barriers to discharge w/patient and caregiver  - Arrange for needed discharge resources and transportation as appropriate  - Identify discharge learning needs (meds, wound care, etc )  - Arrange for interpretive services to assist at discharge as needed  - Refer to Case Management Department for coordinating discharge planning if the patient needs post-hospital services based on physician/advanced practitioner order or complex needs related to functional status, cognitive ability, or social support system  Outcome: Progressing     Problem: Knowledge Deficit  Goal: Patient/family/caregiver demonstrates understanding of disease process, treatment plan, medications, and discharge instructions  Description: Complete learning assessment and assess knowledge base    Interventions:  - Provide teaching at level of understanding  - Provide teaching via preferred learning methods  Outcome: Progressing     Problem: Prexisting or High Potential for Compromised Skin Integrity  Goal: Skin integrity is maintained or improved  Description: INTERVENTIONS:  - Identify patients at risk for skin breakdown  - Assess and monitor skin integrity  - Assess and monitor nutrition and hydration status  - Monitor labs   - Assess for incontinence   - Turn and reposition patient  - Assist with mobility/ambulation  - Relieve pressure over bony prominences  - Avoid friction and shearing  - Provide appropriate hygiene as needed including keeping skin clean and dry  - Evaluate need for skin moisturizer/barrier cream  - Collaborate with interdisciplinary team   - Patient/family teaching  - Consider wound care consult   Outcome: Progressing     Problem: Nutrition/Hydration-ADULT  Goal: Nutrient/Hydration intake appropriate for improving, restoring or maintaining nutritional needs  Description: Monitor and assess patient's nutrition/hydration status for malnutrition  Collaborate with interdisciplinary team and initiate plan and interventions as ordered  Monitor patient's weight and dietary intake as ordered or per policy  Utilize nutrition screening tool and intervene as necessary  Determine patient's food preferences and provide high-protein, high-caloric foods as appropriate       INTERVENTIONS:  - Monitor oral intake, urinary output, labs, and treatment plans  - Assess nutrition and hydration status and recommend course of action  - Evaluate amount of meals eaten  - Assist patient with eating if necessary   - Allow adequate time for meals  - Recommend/ encourage appropriate diets, oral nutritional supplements, and vitamin/mineral supplements  - Order, calculate, and assess calorie counts as needed  - Recommend, monitor, and adjust tube feedings and TPN/PPN based on assessed needs  - Assess need for intravenous fluids  - Provide specific nutrition/hydration education as appropriate  - Include patient/family/caregiver in decisions related to nutrition  Outcome: Progressing

## 2021-06-09 NOTE — PROGRESS NOTES
[address] [facility]  Progress Note - Nazia Seamus 1955, 77 y o  male MRN: [id number]  Unit/Bed#: E5 -01 Encounter: [id number]  Primary Care Provider: Rayray Oliva MD   Date and time admitted to hospital: 6/2/2021  3:12 PM    Sinus tachycardia  Assessment & Plan  Continues and likely reactive to malignancy and fever  Will continue to monitor  2D echo is still pending  Cardiology involved and follows    Acute respiratory failure [facility])  Assessment & Plan  Patient actually does not meet the criteria for oxygen at this time  Noted pulmonary congestion on chest x-ray  2D echo still pending  Cardiology recommends Lasix IV    Hypokalemia  Assessment & Plan  · Will continue to replace  · Potassium improved at 3 7 this morning    Single subsegmental pulmonary embolism without acute cor pulmonale (HCC)  Assessment & Plan  · CT scan performed in 6/4 revealed subsegmental pulmonary embolism in right lower lobe  · Continue to monitor  · Will transition to Eliquis 10 b i d  x7 days followed by 5 BID when clinically appropriate     Hypotension  Assessment & Plan  · Patient has remained hypotensive throughout admission  · Improved today  · Midodrine 15mg TID  · Blood pressures are currently improved    Severe protein-calorie malnutrition ([address] )  Assessment & Plan  Malnutrition Findings:   Adult Malnutrition type: Chronic illness  Adult Degree of Malnutrition: Other severe protein calorie malnutrition    BMI Findings:  Adult BMI Classifications: Underweight < 18 5     Body mass index is 16 59 kg/m²  · In the setting of lymphoma, as evidenced by muscle wasting, fat loss, inadequate energy intake  · Requires nutritional supplements and dietitian consult    Hyponatremia  Assessment & Plan  · Mild likely due to underlying malignancy  · Monitor while inpatient    Anemia  Assessment & Plan  · Likely secondary to lymphoma    · He has no jaundice/elevated LFTs to suggest hemolysis  · He has no sign of bleeding  · Hemoglobin on admission 6 3  · Improved now with transfusions    Hodgkin lymphoma of intra-abdominal lymph nodes ([facility] [address] )  Assessment & Plan  · Stage IV B  · Initial diagnosis in 2018, initially denied treatment secondary to [employer]regational beliefs  · Known to hematologist/oncologist Dr Olga Lidia Keller, at [facility]  · According to his wife and [facility] records he will need immunotherapy , however this has not been started due to insurance issues  · Patient refused chemotherapy in the past, was previously treated with Nivolumab  · Patient was last evaluated by Dr Olga Lidia Keller on 5/4 and agreed to restarting Nivolumab every 2 weeks, is scheduled to restart on 06/08  · CT Chest, abdomen , pelvis with contrast--> evidence multifocal malignancy in the chest, abdomen, and pelvis involving lung parenchyma, lymph nodes, liver, and spleen  Subsegmental right lower lobe PE  Small pleural effusions  · Heme onc follows    * Systemic inflammatory response syndrome (SIRS) due to noninfectious process [facility])  Assessment & Plan  · Likely SIRS from B symptoms due to lymphoma  · However, noted to have elevated lactic acid together with fever and tachycardia on admission  · Infectious disease suspects this is likely secondary to stage IV Hodgkin lymphoma with metastasis-discontinued antibiotics   · Patient admitted to [facility] approximately a week and half ago for similar symptoms-plan for outpatient follow-up on Tuesday if discharged prior to then  Unfortunately, patient with respiratory failure was not able to be discharged to make the appointment  · CT Chest, abdomen , pelvis with contrast--> evidence multifocal malignancy in the chest, abdomen, and pelvis involving lung parenchyma, lymph nodes, liver, and spleen  Subsegmental right lower lobe PE    Small pleural effusions  · Seen in consult by Hematology-Oncology        VTE Pharmacologic Prophylaxis:   Pharmacologic: Apixaban (Eliquis)  Mechanical VTE Prophylaxis in Place: Yes    Patient Centered Rounds: I have performed bedside rounds with nursing staff today  Discussions with Specialists or Other Care Team Provider: cardiology    Education and Discussions with Family / Patient:  Patient's wife    Time Spent for Care: 45 minutes  More than 50% of total time spent on counseling and coordination of care as described above  Current Length of Stay: 7 day(s)    Current Patient Status: Inpatient   Certification Statement: The patient will continue to require additional inpatient hospital stay due to Cardiac workup    Discharge Plan:  24 hours    Code Status: Level 1 - Full Code      Subjective:   Patient seen examined  He appears much better today than yesterday  Denies shortness of breath  I have used the  to communicate with the patient and his wife  Patient continues to claim that he wants to proceed with all treatment, including full code status  Objective:     Vitals:   Temp (24hrs), Av °F (38 3 °C), Min:99 9 °F (37 7 °C), Max:102 3 °F (39 1 °C)    Temp:  [99 9 °F (37 7 °C)-102 3 °F (39 1 °C)] 100 8 °F (38 2 °C)  HR:  [108-117] 115  Resp:  [16-20] 16  BP: ()/(64-72) 97/64  SpO2:  [97 %-100 %] 98 %  Body mass index is 16 59 kg/m²  Input and Output Summary (last 24 hours):     No intake or output data in the 24 hours ending 21    Physical Exam:     Physical Exam  Constitutional:       Appearance: He is cachectic  He is ill-appearing  Cardiovascular:      Rate and Rhythm: Normal rate and regular rhythm  Heart sounds: Normal heart sounds  Pulmonary:      Effort: Pulmonary effort is normal  No respiratory distress  Breath sounds: Normal breath sounds  Abdominal:      Palpations: Abdomen is soft  Tenderness: There is no abdominal tenderness  Skin:     General: Skin is warm  Findings: No erythema or rash  Neurological:      Mental Status: He is alert           Additional Data: Labs:    Results from last 7 days   Lab Units 06/08/21  1834   WBC Thousand/uL 6 53   HEMOGLOBIN g/dL 8 7*   HEMATOCRIT % 27 9*   PLATELETS Thousands/uL 269   NEUTROS PCT % 89*   LYMPHS PCT % 4*   MONOS PCT % 4   EOS PCT % 2     Results from last 7 days   Lab Units 06/09/21  0853  06/04/21  0501   SODIUM mmol/L 130*   < > 135*   POTASSIUM mmol/L 3 7   < > 3 8   CHLORIDE mmol/L 92*   < > 95*   CO2 mmol/L 36*   < > 30   BUN mg/dL 12   < > 14   CREATININE mg/dL 0 57*   < > 0 58*   ANION GAP mmol/L 2*   < > 10   CALCIUM mg/dL 8 2*   < > 8 7   ALBUMIN g/dL  --   --  1 4*   TOTAL BILIRUBIN mg/dL  --   --  0 61   ALK PHOS U/L  --   --  151*   ALT U/L  --   --  34   AST U/L  --   --  23   GLUCOSE RANDOM mg/dL 96   < > 91    < > = values in this interval not displayed  Results from last 7 days   Lab Units 06/04/21  1959   INR  1 79*             Results from last 7 days   Lab Units 06/05/21  0534 06/04/21  0501 06/03/21  2262   PROCALCITONIN ng/ml 25 75* 29 79* 8 38*           * I Have Reviewed All Lab Data Listed Above  * Additional Pertinent Lab Tests Reviewed: All Labs Within Last 24 Hours Reviewed    Imaging:      Recent Cultures (last 7 days):           Last 24 Hours Medication List:   Current Facility-Administered Medications   Medication Dose Route Frequency Provider Last Rate    acetaminophen  650 mg Oral Q6H PRN Willem Howe MD      apixaban  10 mg Oral BID Janessa Bryan MD      Followed by   Mignon Nova ON 6/14/2021] apixaban  5 mg Oral BID Janessa Bryan MD      furosemide  20 mg Intravenous Once Shirley Evans DO      ipratropium  0 5 mg Nebulization TID Elise Lara PA-C      levalbuterol  1 25 mg Nebulization TID Elise Lara PA-C      midodrine  15 mg Oral TID VALENTINA Montalvo PA-C          Today, Patient Was Seen By: Janessa Bryan MD    ** Please Note: Dictation voice to text software may have been used in the creation of this document   **

## 2021-06-09 NOTE — ASSESSMENT & PLAN NOTE
· Stage IV B  · Initial diagnosis in 2018, initially denied treatment secondary to [employer] beliefs  · Known to hematologist/oncologist Dr Reji Yoon, at [facility]  · According to his wife and [facility] records he will need immunotherapy , however this has not been started due to insurance issues  · Patient refused chemotherapy in the past, was previously treated with Nivolumab  · Patient was last evaluated by Dr Reji Yoon on 5/4 and agreed to restarting Nivolumab every 2 weeks, is scheduled to restart on 06/08  · CT Chest, abdomen , pelvis with contrast--> evidence multifocal malignancy in the chest, abdomen, and pelvis involving lung parenchyma, lymph nodes, liver, and spleen  Subsegmental right lower lobe PE    Small pleural effusions  · Heme onc follows

## 2021-06-09 NOTE — RESPIRATORY THERAPY NOTE
Home Oxygen Qualifying Test       Patient name: Yifan Reyes        : 1955   Date of Test:  2021  Diagnosis:      Home Oxygen Test:    Medicare Guidelines require item(s) 1-5 on all ambulatory patients or 1 and 2 on non-ambulatory patients  1   Baseline SPO2 on Room Air at rest 95%  2  SPO2 during exercise on Room Air 92%  During exercise monitor SpO2  If SPO2 increases >=89% with ambulation do not add supplemental oxygen  If <= 88% on room air add O2 via NC and titrate patient  Patient must be ambulated with O2 and titrated to > 88% with exertion  3   SPO2 on Oxygen at rest  N/A    4  SPO2 during exercise on Oxygen  N/A    5  Exercise performed: Pt unable to walk for the home oxygen evalutation test  Test done with patient on RA and in chair while monitoring oxygen saturation for 6 minutes at bedside  []  Supplemental Home Oxygen is indicated  [x]  Client does not qualify for home oxygen      Respiratory Additional Notes- Family member at bedside during test  RN made aware of test     Naida Scott, RT

## 2021-06-09 NOTE — ASSESSMENT & PLAN NOTE
Patient actually does not meet the criteria for oxygen at this time  Noted pulmonary congestion on chest x-ray  2D echo still pending    Cardiology recommends Lasix IV

## 2021-06-09 NOTE — ASSESSMENT & PLAN NOTE
Continues and likely reactive to malignancy and fever  Will continue to monitor    2D echo is still pending  Cardiology involved and follows

## 2021-06-09 NOTE — ASSESSMENT & PLAN NOTE
· Likely SIRS from B symptoms due to lymphoma  · However, noted to have elevated lactic acid together with fever and tachycardia on admission  · Infectious disease suspects this is likely secondary to stage IV Hodgkin lymphoma with metastasis-discontinued antibiotics   · Patient admitted to [facility] approximately a week and half ago for similar symptoms-plan for outpatient follow-up on Tuesday if discharged prior to then  Unfortunately, patient with respiratory failure was not able to be discharged to make the appointment  · CT Chest, abdomen , pelvis with contrast--> evidence multifocal malignancy in the chest, abdomen, and pelvis involving lung parenchyma, lymph nodes, liver, and spleen  Subsegmental right lower lobe PE    Small pleural effusions  · Seen in consult by Hematology-Oncology

## 2021-06-09 NOTE — OCCUPATIONAL THERAPY NOTE
Occupational Therapy Cancellation Note        Patient Name: El Pickett  Today's Date: 6/9/2021      Attempted to see patient for OT session and RN reports to cancel at this time as elevated heart rate at rest  Will continue to follow to address OT POC as appropriate       Franklyn Phan MS, OTR/L

## 2021-06-09 NOTE — PHYSICAL THERAPY NOTE
06/09/21 1255   PT Last Visit   PT Visit Date 06/09/21   Note Type   Note Type Treatment   Cancel Reasons Medical status   Lela Ferro PTA

## 2021-06-10 VITALS
HEART RATE: 124 BPM | TEMPERATURE: 98.3 F | DIASTOLIC BLOOD PRESSURE: 59 MMHG | HEIGHT: 72 IN | RESPIRATION RATE: 16 BRPM | OXYGEN SATURATION: 94 % | SYSTOLIC BLOOD PRESSURE: 93 MMHG | BODY MASS INDEX: 16.57 KG/M2 | WEIGHT: 122.36 LBS

## 2021-06-10 LAB
ANION GAP SERPL CALCULATED.3IONS-SCNC: 3 MMOL/L (ref 4–13)
BASOPHILS # BLD AUTO: 0 THOUSANDS/ΜL (ref 0–0.1)
BASOPHILS NFR BLD AUTO: 0 % (ref 0–1)
BUN SERPL-MCNC: 12 MG/DL (ref 5–25)
CALCIUM SERPL-MCNC: 8.4 MG/DL (ref 8.3–10.1)
CHLORIDE SERPL-SCNC: 91 MMOL/L (ref 100–108)
CO2 SERPL-SCNC: 37 MMOL/L (ref 21–32)
CREAT SERPL-MCNC: 0.66 MG/DL (ref 0.6–1.3)
DME PARACHUTE DELIVERY DATE ACTUAL: NORMAL
DME PARACHUTE DELIVERY DATE ACTUAL: NORMAL
DME PARACHUTE DELIVERY DATE EXPECTED: NORMAL
DME PARACHUTE DELIVERY DATE REQUESTED: NORMAL
DME PARACHUTE DELIVERY DATE REQUESTED: NORMAL
DME PARACHUTE ITEM DESCRIPTION: NORMAL
DME PARACHUTE ORDER STATUS: NORMAL
DME PARACHUTE ORDER STATUS: NORMAL
DME PARACHUTE SUPPLIER NAME: NORMAL
DME PARACHUTE SUPPLIER NAME: NORMAL
DME PARACHUTE SUPPLIER PHONE: NORMAL
DME PARACHUTE SUPPLIER PHONE: NORMAL
EOSINOPHIL # BLD AUTO: 0.22 THOUSAND/ΜL (ref 0–0.61)
EOSINOPHIL NFR BLD AUTO: 4 % (ref 0–6)
ERYTHROCYTE [DISTWIDTH] IN BLOOD BY AUTOMATED COUNT: 22.5 % (ref 11.6–15.1)
GFR SERPL CREATININE-BSD FRML MDRD: 101 ML/MIN/1.73SQ M
GLUCOSE SERPL-MCNC: 140 MG/DL (ref 65–140)
HCT VFR BLD AUTO: 25.3 % (ref 36.5–49.3)
HCT VFR BLD AUTO: 26.9 % (ref 36.5–49.3)
HGB BLD-MCNC: 7.7 G/DL (ref 12–17)
HGB BLD-MCNC: 8.2 G/DL (ref 12–17)
IMM GRANULOCYTES # BLD AUTO: 0.1 THOUSAND/UL (ref 0–0.2)
IMM GRANULOCYTES NFR BLD AUTO: 2 % (ref 0–2)
LYMPHOCYTES # BLD AUTO: 0.12 THOUSANDS/ΜL (ref 0.6–4.47)
LYMPHOCYTES NFR BLD AUTO: 2 % (ref 14–44)
MCH RBC QN AUTO: 23.3 PG (ref 26.8–34.3)
MCHC RBC AUTO-ENTMCNC: 30.4 G/DL (ref 31.4–37.4)
MCV RBC AUTO: 77 FL (ref 82–98)
MONOCYTES # BLD AUTO: 0.2 THOUSAND/ΜL (ref 0.17–1.22)
MONOCYTES NFR BLD AUTO: 4 % (ref 4–12)
NEUTROPHILS # BLD AUTO: 4.41 THOUSANDS/ΜL (ref 1.85–7.62)
NEUTS SEG NFR BLD AUTO: 88 % (ref 43–75)
NRBC BLD AUTO-RTO: 0 /100 WBCS
PLATELET # BLD AUTO: 247 THOUSANDS/UL (ref 149–390)
PMV BLD AUTO: 10.1 FL (ref 8.9–12.7)
POTASSIUM SERPL-SCNC: 3.2 MMOL/L (ref 3.5–5.3)
RBC # BLD AUTO: 3.3 MILLION/UL (ref 3.88–5.62)
SODIUM SERPL-SCNC: 131 MMOL/L (ref 136–145)
WBC # BLD AUTO: 5.05 THOUSAND/UL (ref 4.31–10.16)

## 2021-06-10 PROCEDURE — 85025 COMPLETE CBC W/AUTO DIFF WBC: CPT | Performed by: FAMILY MEDICINE

## 2021-06-10 PROCEDURE — 80048 BASIC METABOLIC PNL TOTAL CA: CPT | Performed by: FAMILY MEDICINE

## 2021-06-10 PROCEDURE — 85018 HEMOGLOBIN: CPT | Performed by: FAMILY MEDICINE

## 2021-06-10 PROCEDURE — 99232 SBSQ HOSP IP/OBS MODERATE 35: CPT | Performed by: INTERNAL MEDICINE

## 2021-06-10 PROCEDURE — 94660 CPAP INITIATION&MGMT: CPT

## 2021-06-10 PROCEDURE — 99239 HOSP IP/OBS DSCHRG MGMT >30: CPT | Performed by: FAMILY MEDICINE

## 2021-06-10 PROCEDURE — 85014 HEMATOCRIT: CPT | Performed by: FAMILY MEDICINE

## 2021-06-10 PROCEDURE — 94640 AIRWAY INHALATION TREATMENT: CPT

## 2021-06-10 RX ORDER — POTASSIUM CHLORIDE 20 MEQ/1
40 TABLET, EXTENDED RELEASE ORAL ONCE
Status: COMPLETED | OUTPATIENT
Start: 2021-06-10 | End: 2021-06-10

## 2021-06-10 RX ORDER — ASPIRIN 81 MG/1
81 TABLET ORAL DAILY
Status: DISCONTINUED | OUTPATIENT
Start: 2021-06-10 | End: 2021-06-10 | Stop reason: HOSPADM

## 2021-06-10 RX ORDER — FUROSEMIDE 20 MG/1
20 TABLET ORAL DAILY
Status: DISCONTINUED | OUTPATIENT
Start: 2021-06-10 | End: 2021-06-10 | Stop reason: HOSPADM

## 2021-06-10 RX ORDER — FUROSEMIDE 20 MG/1
20 TABLET ORAL DAILY
Qty: 30 TABLET | Refills: 0 | Status: SHIPPED | OUTPATIENT
Start: 2021-06-11

## 2021-06-10 RX ORDER — POTASSIUM CHLORIDE 20MEQ/15ML
20 LIQUID (ML) ORAL DAILY
Qty: 473 ML | Refills: 0 | Status: SHIPPED | OUTPATIENT
Start: 2021-06-10

## 2021-06-10 RX ORDER — ASPIRIN 81 MG/1
81 TABLET ORAL DAILY
Qty: 30 TABLET | Refills: 0 | Status: SHIPPED | OUTPATIENT
Start: 2021-06-11

## 2021-06-10 RX ADMIN — MIDODRINE HYDROCHLORIDE 15 MG: 5 TABLET ORAL at 12:13

## 2021-06-10 RX ADMIN — LEVALBUTEROL HYDROCHLORIDE 1.25 MG: 1.25 SOLUTION, CONCENTRATE RESPIRATORY (INHALATION) at 07:35

## 2021-06-10 RX ADMIN — MIDODRINE HYDROCHLORIDE 15 MG: 5 TABLET ORAL at 05:48

## 2021-06-10 RX ADMIN — ACETAMINOPHEN 650 MG: 325 TABLET, FILM COATED ORAL at 02:40

## 2021-06-10 RX ADMIN — ASPIRIN 81 MG: 81 TABLET ORAL at 10:06

## 2021-06-10 RX ADMIN — IPRATROPIUM BROMIDE 0.5 MG: 0.5 SOLUTION RESPIRATORY (INHALATION) at 07:35

## 2021-06-10 RX ADMIN — POTASSIUM CHLORIDE 40 MEQ: 1500 TABLET, EXTENDED RELEASE ORAL at 10:06

## 2021-06-10 RX ADMIN — APIXABAN 10 MG: 5 TABLET, FILM COATED ORAL at 10:06

## 2021-06-10 NOTE — PHYSICAL THERAPY NOTE
Physical Therapy Cancellation Note    Discussed with RN, pt  Remains with elevated HR in 120's at rest  Not appropriate for therapeutic intervention at this time  Will cancel and continue to follow as appropriate       Martha Manuel, PTA

## 2021-06-10 NOTE — CASE MANAGEMENT
Per LUDIVINAIM, patient is medically clear and ready for discharge  Patient will go home on Eliquis  GERDA called the pharmacy to check on pricing  Patient will receive a 30 day free trial  After the free trial he has a copay of $4 00 for refills  SO was notified of this and she verbalized understanding  SO reports that family member will transport patient home today  IMM reviewed with patient  patient agree with discharge determination  IMM placed in scan bin      At this time there are no other CM needs

## 2021-06-10 NOTE — PLAN OF CARE
Problem: Potential for Falls  Goal: Patient will remain free of falls  Description: INTERVENTIONS:  - Assess patient frequently for physical needs  -  Identify cognitive and physical deficits and behaviors that affect risk of falls    -  [place] fall precautions as indicated by assessment   - Educate patient/family on patient safety including physical limitations  - Instruct patient to call for assistance with activity based on assessment  - Modify environment to reduce risk of injury  - Consider OT/PT consult to assist with strengthening/mobility  Outcome: Progressing     Problem: PAIN - ADULT  Goal: Verbalizes/displays adequate comfort level or baseline comfort level  Description: Interventions:  - Encourage patient to monitor pain and request assistance  - Assess pain using appropriate pain scale  - Administer analgesics based on type and severity of pain and evaluate response  - Implement non-pharmacological measures as appropriate and evaluate response  - Consider cultural and social influences on pain and pain management  - Notify physician/advanced practitioner if interventions unsuccessful or patient reports new pain  Outcome: Progressing     Problem: INFECTION - ADULT  Goal: Absence or prevention of progression during hospitalization  Description: INTERVENTIONS:  - Assess and monitor for signs and symptoms of infection  - Monitor lab/diagnostic results  - Monitor all insertion sites, i e  indwelling lines, tubes, and drains  - Monitor endotracheal if appropriate and nasal secretions for changes in amount and color  - [place] appropriate cooling/warming therapies per order  - Administer medications as ordered  - Instruct and encourage patient and family to use good hand hygiene technique  - Identify and instruct in appropriate isolation precautions for identified infection/condition  Outcome: Progressing  Goal: Absence of fever/infection during neutropenic period  Description: INTERVENTIONS:  - Monitor WBC    Outcome: Progressing     Problem: SAFETY ADULT  Goal: Patient will remain free of falls  Description: INTERVENTIONS:  - Assess patient frequently for physical needs  -  Identify cognitive and physical deficits and behaviors that affect risk of falls    -  [place] fall precautions as indicated by assessment   - Educate patient/family on patient safety including physical limitations  - Instruct patient to call for assistance with activity based on assessment  - Modify environment to reduce risk of injury  - Consider OT/PT consult to assist with strengthening/mobility  Outcome: Progressing  Goal: Maintain or return to baseline ADL function  Description: INTERVENTIONS:  -  Assess patient's ability to carry out ADLs; assess patient's baseline for ADL function and identify physical deficits which impact ability to perform ADLs (bathing, care of mouth/teeth, toileting, grooming, dressing, etc )  - Assess/evaluate cause of self-care deficits   - Assess range of motion  - Assess patient's mobility; develop plan if impaired  - Assess patient's need for assistive devices and provide as appropriate  - Encourage maximum independence but intervene and supervise when necessary  - Involve family in performance of ADLs  - Assess for home care needs following discharge   - Consider OT consult to assist with ADL evaluation and planning for discharge  - Provide patient education as appropriate  Outcome: Progressing  Goal: Maintain or return mobility status to optimal level  Description: INTERVENTIONS:  - Assess patient's baseline mobility status (ambulation, transfers, stairs, etc )    - Identify cognitive and physical deficits and behaviors that affect mobility  - Identify mobility aids required to assist with transfers and/or ambulation (gait belt, sit-to-stand, lift, walker, cane, etc )  - [place] fall precautions as indicated by assessment  - Record patient progress and toleration of activity level on Mobility SBAR; progress patient to next Phase/Stage  - Instruct patient to call for assistance with activity based on assessment  - Consider rehabilitation consult to assist with strengthening/weightbearing, etc   Outcome: Progressing     Problem: DISCHARGE PLANNING  Goal: Discharge to home or other facility with appropriate resources  Description: INTERVENTIONS:  - Identify barriers to discharge w/patient and caregiver  - Arrange for needed discharge resources and transportation as appropriate  - Identify discharge learning needs (meds, wound care, etc )  - Arrange for interpretive services to assist at discharge as needed  - Refer to Case Management Department for coordinating discharge planning if the patient needs post-hospital services based on physician/advanced practitioner order or complex needs related to functional status, cognitive ability, or social support system  Outcome: Progressing     Problem: Knowledge Deficit  Goal: Patient/family/caregiver demonstrates understanding of disease process, treatment plan, medications, and discharge instructions  Description: Complete learning assessment and assess knowledge base    Interventions:  - Provide teaching at level of understanding  - Provide teaching via preferred learning methods  Outcome: Progressing     Problem: Prexisting or High Potential for Compromised Skin Integrity  Goal: Skin integrity is maintained or improved  Description: INTERVENTIONS:  - Identify patients at risk for skin breakdown  - Assess and monitor skin integrity  - Assess and monitor nutrition and hydration status  - Monitor labs   - Assess for incontinence   - Turn and reposition patient  - Assist with mobility/ambulation  - Relieve pressure over bony prominences  - Avoid friction and shearing  - Provide appropriate hygiene as needed including keeping skin clean and dry  - Evaluate need for skin moisturizer/barrier cream  - Collaborate with interdisciplinary team   - Patient/family teaching  - Consider wound care consult   Outcome: Progressing     Problem: Nutrition/Hydration-ADULT  Goal: Nutrient/Hydration intake appropriate for improving, restoring or maintaining nutritional needs  Description: Monitor and assess patient's nutrition/hydration status for malnutrition  Collaborate with interdisciplinary team and initiate plan and interventions as ordered  Monitor patient's weight and dietary intake as ordered or per policy  Utilize nutrition screening tool and intervene as necessary  Determine patient's food preferences and provide high-protein, high-caloric foods as appropriate       INTERVENTIONS:  - Monitor oral intake, urinary output, labs, and treatment plans  - Assess nutrition and hydration status and recommend course of action  - Evaluate amount of meals eaten  - Assist patient with eating if necessary   - Allow adequate time for meals  - Recommend/ encourage appropriate diets, oral nutritional supplements, and vitamin/mineral supplements  - Order, calculate, and assess calorie counts as needed  - Recommend, monitor, and adjust tube feedings and TPN/PPN based on assessed needs  - Assess need for intravenous fluids  - Provide specific nutrition/hydration education as appropriate  - Include patient/family/caregiver in decisions related to nutrition  Outcome: Progressing

## 2021-06-10 NOTE — PROGRESS NOTES
Cardiology Progress Note   MD Natalie Alexis MD Nova Duos, DO, MD Michelet Catherine DO, Conchita Gonsalves DO, [facility]  ----------------------------------------------------------------  [address] [place] 77 y o  male MRN: [id number]  Unit/Bed#: E5 -01 Encounter: [id number]      ASSESSMENT:   · SIRS without clear source of sepsis  · Subsegmental pulmonary embolism on CTA, June 2021  · Persistent sinus tachycardia  · Chronic combined systolic and diastolic heart failure  · Cardiomyopathy of unknown etiology  · LVEF 71%, grade 1 diastolic dysfunction, inferoapical, apical, apical anterior and anterolateral regional wall motion abnormalities, mild RV dilatation with normal systolic function, mild biatrial dilatation, AV sclerosis, trace MR, fibrocalcific changes of mitral valve, mild ascending aortic dilatation at 4 3 cm, trace pericardial effusion, small moderate left-sided pleural effusion, June 2021  · Stage IV Hodgkin's lymphoma with metastatic disease  · Non MI troponin elevation secondary to anemia and PE  · Anemia with hemoglobin 6 7  · Abnormal ECG    PLAN:  Spoke with patient and wife using translation with [employer]  Explained patient's new finding of cardiomyopathy with EF of 35%  Sinus tachycardia is likely a result of Pulmonary embolism with concomitant cardiomyopathy and anemia  Patient is extremely weak and fragile; I do not believe he would effectively be able to operate life vest given his current clinical presentation  He is on midodrine and would not initiate ACE-inhibitor/ARB  Reasonable to try Toprol-XL 12 5 mg daily with tachycardia and cardiomyopathy  Initiate Lasix 20 mg p o  daily with pleural effusions and closely monitor weights  Discussed possibility of obstructive CAD at length with patient and his wife and they feel strongly that they would like to hold off on any invasive testing regarding his cardiac disease at this time; risks and benefits discussed  I believe this is reasonable as should the patient require stenting, as he is likely to go on chemotherapy there is often significant drop in platelet count and he could have significant bleeding while on dual anti-platelet therapy  Appearance may represent takotsubo cardiomyopathy which would not be unreasonable folic given his clinical presentation  Would repeat echocardiogram in 3 months or less  Eliquis management as per primary team  Outpatient follow-up for additional CV testing as clinically indicated within 1-2 weeks of discharge    Signed: Sourav Ayala DO, [facility], [facility]      History of Present Illness:  Patient seen and examined  Denies chest pain, pressure, tightness or squeezing  Admits to some shortness of breath  Denies lower extremity swelling, orthopnea or paroxysmal nocturnal dyspnea      Review of Systems:  Review of Systems   Constitution: Negative for decreased appetite, fever, weight gain and weight loss  HENT: Negative for congestion and sore throat  Eyes: Negative for visual disturbance  Cardiovascular: Negative for chest pain, dyspnea on exertion, leg swelling, near-syncope and palpitations  Respiratory: Negative for cough and shortness of breath  Hematologic/Lymphatic: Negative for bleeding problem  Skin: Negative for rash  Musculoskeletal: Negative for myalgias and neck pain  Gastrointestinal: Negative for abdominal pain and nausea  Neurological: Negative for light-headedness and weakness  Psychiatric/Behavioral: Negative for depression  No Known Allergies    No current facility-administered medications on file prior to encounter        Current Outpatient Medications on File Prior to Encounter   Medication Sig    ferrous sulfate 325 (65 Fe) mg tablet Take 325 mg by mouth daily with breakfast    ibuprofen (MOTRIN) 200 mg tablet Take 400 mg by mouth every 6 (six) hours as needed for mild pain    loratadine (CLARITIN) 10 mg tablet Take 10 mg by mouth daily    pantoprazole (PROTONIX) 40 mg tablet Take 40 mg by mouth daily        Current Facility-Administered Medications   Medication Dose Route Frequency Provider Last Rate    acetaminophen  650 mg Oral Q6H PRN Particia Barber MD      apixaban  10 mg Oral BID Mignon Pond MD      Followed by   Vick Aiken ON 6/14/2021] apixaban  5 mg Oral BID Mignon Pond MD      aspirin  81 mg Oral Daily Mignon Pond MD      furosemide  20 mg Intravenous Once Viveca Cover, DO      furosemide  20 mg Oral Daily Mignon Pond MD      ipratropium  0 5 mg Nebulization TID Nazia Witt PA-C      levalbuterol  1 25 mg Nebulization TID Nazia Witt PA-C      metoprolol tartrate  12 5 mg Oral Q12H [address], MD      midodrine  15 [facility] TID [facility]              Vitals:    06/09/21 2300 06/10/21 0200 06/10/21 0734 06/10/21 0736   BP: 97/63  101/68    Pulse: 84  (!) 106    Resp:   16    Temp:  (!) 100 6 °F (38 1 °C) 99 1 °F (37 3 °C)    TempSrc:  Temporal     SpO2: 99%  100% 99%   Weight:       Height:             Intake/Output Summary (Last 24 hours) at 6/10/2021 1140  Last data filed at 6/10/2021 0839  Gross per 24 hour   Intake 120 ml   Output 350 ml   Net -230 ml       Weight change:     PHYSICAL EXAMINATION:  Gen: Awake, Alert, NAD, frail appearing, cachectic  Head/eyes: AT/NC, pupils equal and round, Anicteric  ENT: mmm  Neck: Supple, No elevated JVP, trachea midline  Resp:  Decreased breath sounds otherwise clear  CV: tachy reg +S1, S2, No m/r/g  Abd: Soft, NT/ND + BS  Ext: no LE edema bilaterally  Neuro:  Follows commands, moves all extermities  Psych: Appropriate affect, normal mood, pleasant attitude, non-combative  Skin: warm; no rash, erythema or venous stasis changes on exposed skin    Lab Results:  Results from last 7 days   Lab Units 06/10/21  1022 06/10/21  0423   WBC Thousand/uL  --  5 05   HEMOGLOBIN g/dL 8 2* 7 7*   HEMATOCRIT % 26 9* 25 3*   PLATELETS Thousands/uL  --  247     Results from last 7 days   Lab Units 06/10/21  0423  06/04/21  0501   POTASSIUM mmol/L 3 2*   < > 3 8   CHLORIDE mmol/L 91*   < > 95*   CO2 mmol/L 37*   < > 30   BUN mg/dL 12   < > 14   CREATININE mg/dL 0 66   < > 0 58*   CALCIUM mg/dL 8 4   < > 8 7   ALK PHOS U/L  --   --  151*   ALT U/L  --   --  34   AST U/L  --   --  23    < > = values in this interval not displayed  No results found for: TROPONINT      Results from last 7 days   Lab Units 06/08/21  2105 06/08/21  1834   TROPONIN I ng/mL 0 09* 0 12*     Results from last 7 days   Lab Units 06/04/21  1959   INR  1 79*       Tele: SR to ST, 9 beats NSVT    This note was completed in part utilizing M-Modal Fluency Direct Software  Grammatical errors, random word insertions, spelling mistakes, and incomplete sentences may be an occasional consequence of this system secondary to software limitations, ambient noise, and hardware issues  If you have any questions or concerns about the content, text, or information contained within the body of this dictation, please contact the provider for clarification

## 2021-06-10 NOTE — PLAN OF CARE
Problem: Potential for Falls  Goal: Patient will remain free of falls  Description: INTERVENTIONS:  - Assess patient frequently for physical needs  -  Identify cognitive and physical deficits and behaviors that affect risk of falls    -  [place] fall precautions as indicated by assessment   - Educate patient/family on patient safety including physical limitations  - Instruct patient to call for assistance with activity based on assessment  - Modify environment to reduce risk of injury  - Consider OT/PT consult to assist with strengthening/mobility  Outcome: Progressing     Problem: PAIN - ADULT  Goal: Verbalizes/displays adequate comfort level or baseline comfort level  Description: Interventions:  - Encourage patient to monitor pain and request assistance  - Assess pain using appropriate pain scale  - Administer analgesics based on type and severity of pain and evaluate response  - Implement non-pharmacological measures as appropriate and evaluate response  - Consider cultural and social influences on pain and pain management  - Notify physician/advanced practitioner if interventions unsuccessful or patient reports new pain  Outcome: Progressing     Problem: INFECTION - ADULT  Goal: Absence or prevention of progression during hospitalization  Description: INTERVENTIONS:  - Assess and monitor for signs and symptoms of infection  - Monitor lab/diagnostic results  - Monitor all insertion sites, i e  indwelling lines, tubes, and drains  - Monitor endotracheal if appropriate and nasal secretions for changes in amount and color  - [place] appropriate cooling/warming therapies per order  - Administer medications as ordered  - Instruct and encourage patient and family to use good hand hygiene technique  - Identify and instruct in appropriate isolation precautions for identified infection/condition  Outcome: Progressing  Goal: Absence of fever/infection during neutropenic period  Description: INTERVENTIONS:  - Monitor WBC    Outcome: Progressing     Problem: SAFETY ADULT  Goal: Patient will remain free of falls  Description: INTERVENTIONS:  - Assess patient frequently for physical needs  -  Identify cognitive and physical deficits and behaviors that affect risk of falls    -  [place] fall precautions as indicated by assessment   - Educate patient/family on patient safety including physical limitations  - Instruct patient to call for assistance with activity based on assessment  - Modify environment to reduce risk of injury  - Consider OT/PT consult to assist with strengthening/mobility  Outcome: Progressing  Goal: Maintain or return to baseline ADL function  Description: INTERVENTIONS:  -  Assess patient's ability to carry out ADLs; assess patient's baseline for ADL function and identify physical deficits which impact ability to perform ADLs (bathing, care of mouth/teeth, toileting, grooming, dressing, etc )  - Assess/evaluate cause of self-care deficits   - Assess range of motion  - Assess patient's mobility; develop plan if impaired  - Assess patient's need for assistive devices and provide as appropriate  - Encourage maximum independence but intervene and supervise when necessary  - Involve family in performance of ADLs  - Assess for home care needs following discharge   - Consider OT consult to assist with ADL evaluation and planning for discharge  - Provide patient education as appropriate  Outcome: Progressing  Goal: Maintain or return mobility status to optimal level  Description: INTERVENTIONS:  - Assess patient's baseline mobility status (ambulation, transfers, stairs, etc )    - Identify cognitive and physical deficits and behaviors that affect mobility  - Identify mobility aids required to assist with transfers and/or ambulation (gait belt, sit-to-stand, lift, walker, cane, etc )  - [place] fall precautions as indicated by assessment  - Record patient progress and toleration of activity level on Mobility SBAR; progress patient to next Phase/Stage  - Instruct patient to call for assistance with activity based on assessment  - Consider rehabilitation consult to assist with strengthening/weightbearing, etc   Outcome: Progressing     Problem: DISCHARGE PLANNING  Goal: Discharge to home or other facility with appropriate resources  Description: INTERVENTIONS:  - Identify barriers to discharge w/patient and caregiver  - Arrange for needed discharge resources and transportation as appropriate  - Identify discharge learning needs (meds, wound care, etc )  - Arrange for interpretive services to assist at discharge as needed  - Refer to Case Management Department for coordinating discharge planning if the patient needs post-hospital services based on physician/advanced practitioner order or complex needs related to functional status, cognitive ability, or social support system  Outcome: Progressing     Problem: Knowledge Deficit  Goal: Patient/family/caregiver demonstrates understanding of disease process, treatment plan, medications, and discharge instructions  Description: Complete learning assessment and assess knowledge base    Interventions:  - Provide teaching at level of understanding  - Provide teaching via preferred learning methods  Outcome: Progressing     Problem: Prexisting or High Potential for Compromised Skin Integrity  Goal: Skin integrity is maintained or improved  Description: INTERVENTIONS:  - Identify patients at risk for skin breakdown  - Assess and monitor skin integrity  - Assess and monitor nutrition and hydration status  - Monitor labs   - Assess for incontinence   - Turn and reposition patient  - Assist with mobility/ambulation  - Relieve pressure over bony prominences  - Avoid friction and shearing  - Provide appropriate hygiene as needed including keeping skin clean and dry  - Evaluate need for skin moisturizer/barrier cream  - Collaborate with interdisciplinary team   - Patient/family teaching  - Consider wound care consult   Outcome: Progressing     Problem: Nutrition/Hydration-ADULT  Goal: Nutrient/Hydration intake appropriate for improving, restoring or maintaining nutritional needs  Description: Monitor and assess patient's nutrition/hydration status for malnutrition  Collaborate with interdisciplinary team and initiate plan and interventions as ordered  Monitor patient's weight and dietary intake as ordered or per policy  Utilize nutrition screening tool and intervene as necessary  Determine patient's food preferences and provide high-protein, high-caloric foods as appropriate       INTERVENTIONS:  - Monitor oral intake, urinary output, labs, and treatment plans  - Assess nutrition and hydration status and recommend course of action  - Evaluate amount of meals eaten  - Assist patient with eating if necessary   - Allow adequate time for meals  - Recommend/ encourage appropriate diets, oral nutritional supplements, and vitamin/mineral supplements  - Order, calculate, and assess calorie counts as needed  - Recommend, monitor, and adjust tube feedings and TPN/PPN based on assessed needs  - Assess need for intravenous fluids  - Provide specific nutrition/hydration education as appropriate  - Include patient/family/caregiver in decisions related to nutrition  Outcome: Progressing

## 2021-06-10 NOTE — NURSING NOTE
Discharge instructions reveiwed and questions answered with help of Syrian speaking staff  Escorted to pharmacy and lobby with PCA and family via wheelchair  Discharged to home with home health

## 2021-06-11 NOTE — ASSESSMENT & PLAN NOTE
Continues and likely reactive to malignancy and fever  Will continue to monitor    2D echo revealed reduced ejection fraction  Unknown etiology of cardiomyopathy  Cardiology involved and follows and recommend to start a beta-blocker and low-dose of Lasix  Patient will not be able to tolerate angiotensin inhibitors due to already low blood pressure

## 2021-06-11 NOTE — ASSESSMENT & PLAN NOTE
· CT scan performed in 6/4 revealed subsegmental pulmonary embolism in right lower lobe  · Continue to monitor  · Continue Eliquis 10 b i d  x7 days followed by 5 BID when clinically appropriate

## 2021-06-11 NOTE — ASSESSMENT & PLAN NOTE
Patient actually does not meet the criteria for oxygen at this time  Noted pulmonary congestion on chest x-ray    2D echo revealed cardiomyopathy with decreased ejection fraction of 35 %  Cardiology recommends Lasix po

## 2021-06-11 NOTE — ASSESSMENT & PLAN NOTE
· Stage IV B  · Initial diagnosis in 2018, initially denied treatment secondary to Mosque beliefs  · Known to hematologist/oncologist Dr Terri Candelaria, at [facility]  · According to his wife and [facility] records he will need immunotherapy , however this has not been started due to insurance issues  · Patient refused chemotherapy in the past, likely will be resume next week  was previously treated with Nivolumab  · Patient was last evaluated by Dr Terri Candelaria on 5/4 and agreed to restarting Nivolumab every 2 weeks, is scheduled to restart on 06/08  · Patient was encouraged to make an appointment as soon as possible to resume chemotherapy  · CT Chest, abdomen , pelvis with contrast--> evidence multifocal malignancy in the chest, abdomen, and pelvis involving lung parenchyma, lymph nodes, liver, and spleen  Subsegmental right lower lobe PE    Small pleural effusions  · Heme onc follows

## 2021-06-11 NOTE — ASSESSMENT & PLAN NOTE
· Likely SIRS from symptoms due to lymphoma  · However, noted to have elevated lactic acid together with fever and tachycardia on admission  · Infectious disease suspects this is likely secondary to stage IV Hodgkin lymphoma with metastasis-discontinued antibiotics   · Patient admitted to [facility] approximately a week and half ago for similar symptoms-plan for outpatient follow-up on Tuesday if discharged prior to then  Unfortunately, patient with respiratory failure was not able to be discharged to make the appointment  · CT Chest, abdomen , pelvis with contrast--> evidence multifocal malignancy in the chest, abdomen, and pelvis involving lung parenchyma, lymph nodes, liver, and spleen  Subsegmental right lower lobe PE    Small pleural effusions  · Seen in consult by Hematology-Oncology

## 2021-06-11 NOTE — DISCHARGE SUMMARY
[address]  Discharge- [place] Pro[place] 1955, 77 y o  male MRN: [id number]  Unit/Bed#: E5 -01 Encounter: [id number]  Primary Care Provider: Kelly Perez MD   Date and time admitted to hospital: 6/2/2021  3:12 PM    Sinus tachycardia  Assessment & Plan  Continues and likely reactive to malignancy and fever  Will continue to monitor  2D echo revealed reduced ejection fraction  Unknown etiology of cardiomyopathy  Cardiology involved and follows and recommend to start a beta-blocker and low-dose of Lasix  Patient will not be able to tolerate angiotensin inhibitors due to already low blood pressure    Acute respiratory failure [facility])  Assessment & Plan  Patient actually does not meet the criteria for oxygen at this time  Noted pulmonary congestion on chest x-ray  2D echo revealed cardiomyopathy with decreased ejection fraction of 35 %  Cardiology recommends Lasix po    Single subsegmental pulmonary embolism without acute cor pulmonale (HCC)  Assessment & Plan  · CT scan performed in 6/4 revealed subsegmental pulmonary embolism in right lower lobe  · Continue to monitor  · Continue Eliquis 10 b i d  x7 days followed by 5 BID when clinically appropriate     Hypotension  Assessment & Plan  · Patient has remained hypotensive throughout admission  · Improved today  · Midodrine 15mg TID  · Blood pressures are currently improved    Severe protein-calorie malnutrition ([address] )  Assessment & Plan  Malnutrition Findings:   Adult Malnutrition type: Chronic illness  Adult Degree of Malnutrition: Other severe protein calorie malnutrition    BMI Findings:  Adult BMI Classifications: Underweight < 18 5     Body mass index is 16 59 kg/m²  · In the setting of lymphoma, as evidenced by muscle wasting, fat loss, inadequate energy intake    · Requires nutritional supplements and dietitian consult    Hyponatremia  Assessment & Plan  · Mild likely due to underlying malignancy  · Monitor while inpatient    Anemia  Assessment & Plan  · Likely secondary to lymphoma  · He has no jaundice/elevated LFTs to suggest hemolysis  · He has no sign of bleeding  · Hemoglobin on admission 6 3  · Improved now with transfusions    Hodgkin lymphoma of intra-abdominal lymph nodes ([facility] [address] )  Assessment & Plan  · Stage IV B  · Initial diagnosis in 2018, initially denied treatment secondary to L[employer] beliefs  · Known to hematologist/oncologist Dr Jose Manuel Burton, at [facility]  · According to his wife and [facility] records he will need immunotherapy , however this has not been started due to insurance issues  · Patient refused chemotherapy in the past, likely will be resume next week  was previously treated with Nivolumab  · Patient was last evaluated by Dr Jose Manuel Burton on 5/4 and agreed to restarting Nivolumab every 2 weeks, is scheduled to restart on 06/08  · Patient was encouraged to make an appointment as soon as possible to resume chemotherapy  · CT Chest, abdomen , pelvis with contrast--> evidence multifocal malignancy in the chest, abdomen, and pelvis involving lung parenchyma, lymph nodes, liver, and spleen  Subsegmental right lower lobe PE  Small pleural effusions  · Heme onc follows    * Systemic inflammatory response syndrome (SIRS) due to noninfectious process [facility])  Assessment & Plan  · Likely SIRS from symptoms due to lymphoma  · However, noted to have elevated lactic acid together with fever and tachycardia on admission  · Infectious disease suspects this is likely secondary to stage IV Hodgkin lymphoma with metastasis-discontinued antibiotics   · Patient admitted to [facility] approximately a week and half ago for similar symptoms-plan for outpatient follow-up on Tuesday if discharged prior to then  Unfortunately, patient with respiratory failure was not able to be discharged to make the appointment    · CT Chest, abdomen , pelvis with contrast--> evidence multifocal malignancy in the chest, abdomen, and pelvis involving lung parenchyma, lymph nodes, liver, and spleen  Subsegmental right lower lobe PE  Small pleural effusions  · Seen in consult by Hematology-Oncology      Discharge Summary - [facility]    Patient Information: Shilo Palacios 77 y o  male MRN: [id number]  Unit/Bed#: E5 -01 Encounter: [id number]    Discharging Physician / Practitioner: Roxanne Harry MD  PCP: Ethan Rivera MD  Admission Date: 6/2/2021  Discharge Date: 06/10/21    Reason for Admission:  Recurrent fever and chills    Discharge Diagnoses:     Principal Problem:    Systemic inflammatory response syndrome (SIRS) due to noninfectious process [facility])  Active Problems:    Hodgkin lymphoma of intra-abdominal lymph nodes (HCC)    Anemia    Hyponatremia    Severe protein-calorie malnutrition ([address] )    Hypotension    Single subsegmental pulmonary embolism without acute cor pulmonale (HCC)    Hypokalemia    Acute respiratory failure ([facility] [address] )    Sinus tachycardia  Resolved Problems:    * No resolved hospital problems  *      Consultations During Hospital Stay:  · Hematology  · Cardiology  · Infectious disease    Procedures Performed:     · None    Significant Findings / Test Results:     · 2D cardiac echo:  1  This is a technically adequate study  2  Left ventricle is normal in size with moderately reduced systolic function  Left ventricular ejection fraction is estimated at 35%  3  Grade 1 diastolic dysfunction  4  Inferoapical, apical and anterior/anterolateral regional wall motion abnormalities  5  Right ventricle is mildly dilated with normal systolic function  6  Mild biatrial dilatation  7  Aortic valve is minimally sclerotic with adequate separation  8  Fibrocalcific changes of the mitral valve with nodular mobile calcification of the chordae  Trace mitral regurgitation  9  Pulmonary artery systolic pressures cannot be estimated due to lack of tricuspid regurgitation jet  10   Ascending aorta is mildly dilated at 4 3 cm  11  Trace pericardial effusion without echocardiographic indications of tamponade physiology  Small to moderate left-sided pleural effusion  12  There is no study for comparison    CT chest abdomen pelvis:   Evidence of multifocal malignancy in the chest, abdomen and pelvis involving lung parenchyma, lymph nodes, liver and spleen      Subsegmental right lower lobe PE      Small pleural effusions  Chest x-ray:  Increasing bibasilar opacities consistent with effusions and on the left side possibly atelectasis/pneumonia      Vascular congestion and Kerley B lines suggesting failure    Incidental Findings:   · Hemoglobin 6 7 on 06/06/2021  · Hemoglobin 8 2 on 06/10/2021    Test Results Pending at Discharge (will require follow up): · None     Outpatient Tests Requested:  · None    Complications:  no    Hospital Course:     Cher Garcia is a 77 y o  male patient he is a 51-year-old male with known history of Hodgkin's lymphoma as far back as August 2018  He has been followed by his hematologist and has had several courses of treatment in 2018, 2019 and 2020  He apparently left in August 2020 to live in the [facility] and came back to [facility] in February 2021  In April, he was found to have progression of the disease with new biopsy-proven metastatic disease to the liver      He was admitted at [facility] last week for fever and chills  He was placed on empiric antibiotics and was seen by ID  Infection was ruled out and his fever and chills were felt to be due to tumor fever  He was sent home in stable condition  He was doing well until today when he had recurrence of the fever and chills  His wife was called from work and he was brought to the ER      On admission he was noted to be febrile and tachycardic meeting sepsis criteria  Patient denied cough, shortness of breath, phlegm, diarrhea, dysuria      During the hospitalization, patient was seen by multiple specialists, including Infectious Disease  Infectious process was ruled out  Patient had CT of the chest, abdomen and pelvis, which revealed a subsegmental pulmonary embolism  Anticoagulation with Eliquis was started  Patient continued to be tachycardic and febrile  This was felt to be due to tumor  He also developed anemia and received a blood transfusion  At 1 point, patient developed shortness of breath  Chest x-ray was done and reveals findings suggestive of CHF  2D echo was done and Cardiology was consulted  Patient had mild troponin elevation and his echo revealed cardiomyopathy with ejection fraction of 35%  At this point, patient is not a good candidate to do any cardiac interventions, therefore, patient will be started on a beta-blocker and a low-dose of Lasix for CHF management  Patient's blood pressure has remained low and requires midodrine 15 mg t i d   Patient is very frail and malnourished  Discussed with his wife through the [employer]  as well as multiple family members for speak English that patient should follow-up with his  Oncologist for the resumption of chemotherapy  I myself contacted his oncologist office at [facility]   Patient will  have chemotherapy started as soon as next week  Condition at Discharge: stable     Discharge Day Visit / Exam:     Subjective:  Patient seen and examined  He denies any complaints this morning  His family in the room  Vitals: Blood Pressure: 93/59 (06/10/21 1207)  Pulse: (!) 124 (06/10/21 1207)  Temperature: 98 3 °F (36 8 °C) (06/10/21 1207)  Temp Source: Temporal (06/10/21 0200)  Respirations: 16 (06/10/21 1207)  Height: 6' (182 9 cm) (06/05/21 0756)  Weight - Scale: 55 5 kg (122 lb 5 7 oz) (06/05/21 0756)  SpO2: 94 % (06/10/21 1317)  Exam:   Physical Exam  Constitutional:       Appearance: He is cachectic  He is ill-appearing  HENT:      Head: Normocephalic and atraumatic     Cardiovascular:      Rate and Rhythm: Regular rhythm  Tachycardia present  Heart sounds: Normal heart sounds  Pulmonary:      Effort: Pulmonary effort is normal  No respiratory distress  Breath sounds: Normal breath sounds  Abdominal:      Palpations: Abdomen is soft  Tenderness: There is no abdominal tenderness  Skin:     General: Skin is warm  Coloration: Skin is pale  Findings: No erythema or rash  Neurological:      Mental Status: He is alert  Discussion with Family:  Patient's wife and patient's brother  Discharge instructions/Information to patient and family:   See after visit summary for information provided to patient and family  Provisions for Follow-Up Care:  See after visit summary for information related to follow-up care and any pertinent home health orders  Disposition:     Home with VNA Services (Reminder: Complete face to face encounter)    For Discharges to [facility]:   · Not Applicable to this Patient - Not Applicable to this Patient    Planned Readmission: no     Discharge Statement:  I spent 60 minutes discharging the patient  This time was spent on the day of discharge  I had direct contact with the patient on the day of discharge  Greater than 50% of the total time was spent examining patient, answering all patient questions, arranging and discussing plan of care with patient as well as directly providing post-discharge instructions  Additional time then spent on discharge activities  Discharge Medications:  See after visit summary for reconciled discharge medications provided to patient and family        ** Please Note: This note has been constructed using a voice recognition system **

## 2023-11-05 NOTE — NURSING NOTE
Pt refused cortisol lab draw  Explained the importance of getting labs done  Pt and family would prefer to have it later  Never